# Patient Record
Sex: FEMALE | Race: WHITE | NOT HISPANIC OR LATINO | Employment: FULL TIME | URBAN - METROPOLITAN AREA
[De-identification: names, ages, dates, MRNs, and addresses within clinical notes are randomized per-mention and may not be internally consistent; named-entity substitution may affect disease eponyms.]

---

## 2017-06-16 ENCOUNTER — APPOINTMENT (EMERGENCY)
Dept: CT IMAGING | Facility: HOSPITAL | Age: 37
End: 2017-06-16
Payer: COMMERCIAL

## 2017-06-16 ENCOUNTER — HOSPITAL ENCOUNTER (EMERGENCY)
Facility: HOSPITAL | Age: 37
Discharge: HOME | End: 2017-06-16
Attending: EMERGENCY MEDICINE | Admitting: EMERGENCY MEDICINE
Payer: COMMERCIAL

## 2017-06-16 VITALS
HEIGHT: 65 IN | DIASTOLIC BLOOD PRESSURE: 58 MMHG | TEMPERATURE: 97.6 F | HEART RATE: 68 BPM | WEIGHT: 176 LBS | RESPIRATION RATE: 16 BRPM | BODY MASS INDEX: 29.32 KG/M2 | OXYGEN SATURATION: 97 % | SYSTOLIC BLOOD PRESSURE: 114 MMHG

## 2017-06-16 DIAGNOSIS — R42 VERTIGO: Primary | ICD-10-CM

## 2017-06-16 LAB
ANION GAP SERPL CALCULATED.3IONS-SCNC: 9 MMOL/L (ref 4–13)
BASOPHILS # BLD AUTO: 0.05 THOUSANDS/ΜL (ref 0–0.1)
BASOPHILS NFR BLD AUTO: 1 % (ref 0–1)
BUN SERPL-MCNC: 7 MG/DL (ref 5–25)
CALCIUM SERPL-MCNC: 9.3 MG/DL (ref 8.3–10.1)
CHLORIDE SERPL-SCNC: 104 MMOL/L (ref 100–108)
CLARITY, POC: CLEAR
CO2 SERPL-SCNC: 26 MMOL/L (ref 21–32)
COLOR, POC: NORMAL
CREAT SERPL-MCNC: 0.64 MG/DL (ref 0.6–1.3)
EOSINOPHIL # BLD AUTO: 0.04 THOUSAND/ΜL (ref 0–0.61)
EOSINOPHIL NFR BLD AUTO: 0 % (ref 0–6)
ERYTHROCYTE [DISTWIDTH] IN BLOOD BY AUTOMATED COUNT: 13.1 % (ref 11.6–15.1)
EXT BILIRUBIN, UA: NORMAL
EXT BLOOD URINE: NEGATIVE
EXT GLUCOSE, UA: NEGATIVE
EXT KETONES: 80
EXT NITRITE, UA: NEGATIVE
EXT PH, UA: 6.5
EXT PROTEIN, UA: NORMAL
EXT SPECIFIC GRAVITY, UA: 1.02
EXT UROBILINOGEN: 0.2
GFR SERPL CREATININE-BSD FRML MDRD: >60 ML/MIN/1.73SQ M
GLUCOSE SERPL-MCNC: 88 MG/DL (ref 65–140)
HCG UR QL: NEGATIVE
HCT VFR BLD AUTO: 37.3 % (ref 34.8–46.1)
HGB BLD-MCNC: 12.5 G/DL (ref 11.5–15.4)
LYMPHOCYTES # BLD AUTO: 2.71 THOUSANDS/ΜL (ref 0.6–4.47)
LYMPHOCYTES NFR BLD AUTO: 27 % (ref 14–44)
MCH RBC QN AUTO: 31 PG (ref 26.8–34.3)
MCHC RBC AUTO-ENTMCNC: 33.5 G/DL (ref 31.4–37.4)
MCV RBC AUTO: 93 FL (ref 82–98)
MONOCYTES # BLD AUTO: 0.79 THOUSAND/ΜL (ref 0.17–1.22)
MONOCYTES NFR BLD AUTO: 8 % (ref 4–12)
NEUTROPHILS # BLD AUTO: 6.36 THOUSANDS/ΜL (ref 1.85–7.62)
NEUTS SEG NFR BLD AUTO: 64 % (ref 43–75)
NRBC BLD AUTO-RTO: 0 /100 WBCS
PLATELET # BLD AUTO: 333 THOUSANDS/UL (ref 149–390)
PMV BLD AUTO: 9.4 FL (ref 8.9–12.7)
POTASSIUM SERPL-SCNC: 3.8 MMOL/L (ref 3.5–5.3)
RBC # BLD AUTO: 4.03 MILLION/UL (ref 3.81–5.12)
SODIUM SERPL-SCNC: 139 MMOL/L (ref 136–145)
WBC # BLD AUTO: 9.97 THOUSAND/UL (ref 4.31–10.16)
WBC # BLD EST: NEGATIVE 10*3/UL

## 2017-06-16 PROCEDURE — 70450 CT HEAD/BRAIN W/O DYE: CPT

## 2017-06-16 PROCEDURE — 85025 COMPLETE CBC W/AUTO DIFF WBC: CPT | Performed by: EMERGENCY MEDICINE

## 2017-06-16 PROCEDURE — 80048 BASIC METABOLIC PNL TOTAL CA: CPT | Performed by: EMERGENCY MEDICINE

## 2017-06-16 PROCEDURE — 93005 ELECTROCARDIOGRAM TRACING: CPT | Performed by: EMERGENCY MEDICINE

## 2017-06-16 PROCEDURE — 81025 URINE PREGNANCY TEST: CPT | Performed by: EMERGENCY MEDICINE

## 2017-06-16 PROCEDURE — 36415 COLL VENOUS BLD VENIPUNCTURE: CPT | Performed by: EMERGENCY MEDICINE

## 2017-06-16 PROCEDURE — 96361 HYDRATE IV INFUSION ADD-ON: CPT

## 2017-06-16 PROCEDURE — 96374 THER/PROPH/DIAG INJ IV PUSH: CPT

## 2017-06-16 PROCEDURE — 81002 URINALYSIS NONAUTO W/O SCOPE: CPT | Performed by: EMERGENCY MEDICINE

## 2017-06-16 PROCEDURE — 99284 EMERGENCY DEPT VISIT MOD MDM: CPT

## 2017-06-16 RX ORDER — ALPRAZOLAM 0.5 MG/1
0.5 TABLET ORAL
COMMUNITY
End: 2019-04-30

## 2017-06-16 RX ORDER — ONDANSETRON 2 MG/ML
4 INJECTION INTRAMUSCULAR; INTRAVENOUS ONCE
Status: COMPLETED | OUTPATIENT
Start: 2017-06-16 | End: 2017-06-16

## 2017-06-16 RX ORDER — MECLIZINE HYDROCHLORIDE 25 MG/1
50 TABLET ORAL ONCE
Status: COMPLETED | OUTPATIENT
Start: 2017-06-16 | End: 2017-06-16

## 2017-06-16 RX ORDER — ONDANSETRON 4 MG/1
4 TABLET, ORALLY DISINTEGRATING ORAL EVERY 8 HOURS PRN
Qty: 12 TABLET | Refills: 0 | Status: SHIPPED | OUTPATIENT
Start: 2017-06-16 | End: 2018-04-19 | Stop reason: ALTCHOICE

## 2017-06-16 RX ADMIN — ONDANSETRON 4 MG: 2 INJECTION INTRAMUSCULAR; INTRAVENOUS at 15:56

## 2017-06-16 RX ADMIN — MECLIZINE HYDROCHLORIDE 50 MG: 25 TABLET ORAL at 17:09

## 2017-06-16 RX ADMIN — SODIUM CHLORIDE 1000 ML: 0.9 INJECTION, SOLUTION INTRAVENOUS at 15:55

## 2017-06-19 LAB
ATRIAL RATE: 62 BPM
P AXIS: 70 DEGREES
PR INTERVAL: 152 MS
QRS AXIS: 78 DEGREES
QRSD INTERVAL: 82 MS
QT INTERVAL: 434 MS
QTC INTERVAL: 440 MS
T WAVE AXIS: 62 DEGREES
VENTRICULAR RATE: 62 BPM

## 2018-01-09 NOTE — RESULT NOTES
Message   Please call  Her genital culture came back + for E Coli, a type of bacteria  I am going to start her on 1 week of antibiotics   will fax     Verified Results  (1) GENITAL COMPREHENSIVE CULTURE [id number] 12:00AM Virginia Zambrano     Test Name Result Flag Reference   CULTURE, GENITAL      CULTURE, GENITAL         MICRO NUMBER:      [id number]    TEST STATUS:       FINAL    SPECIMEN SOURCE:   CERVIX    SPECIMEN QUALITY:  ADEQUATE    RESULT:            Heavy growth of Escherichia coli                            E coli                            ----------------                            INT   FALGUNI     AMOX/CLAVULANATE       S     <=2 0     AMPICILLIN             S     <=2 0     AMP/SULBACTAM          S     <=2 0     CEFAZOLIN              NR    <=4 0     CEFEPIME               S     <=1 0     CEFTRIAXONE            S     <=1 0     CIPROFLOXACIN          S     <=0 25     GENTAMICIN             S     <=1 0     IMIPENEM               S     <=0 25     LEVOFLOXACIN           S     <=0 12     PIP/TAZOBACTAM         S     <=4 0     TOBRAMYCIN             S     <=1 0     TRIMETHOPRIM/SULFA     S     <=20 0  S=Susceptible  I=Intermediate  R=Resistant  * = Not Tested  NR = Not Reported  **NN = See Therapy Comments

## 2018-01-10 NOTE — MISCELLANEOUS
Message   Recorded as Task   Date: 11/03/2016 11:54 AM, Created By: Adrianna Howe   Task Name: Medical Complaint Callback   Assigned To: Umair Garcia   Regarding Patient: Jarrett Acosta, Status: In Progress   Comment:    Adrianna Shyam - 03 Nov 2016 11:54 AM     TASK CREATED  Caller: Self; Medical Complaint; [phone] (Home)  pt called - thinks her BV infection is coming back - had it in 11/2015 - itching, burning, discomfort - please advise  [id number] - 08 Nov 2016 12:35 PM     TASK IN PROGRESS   Lesli Nelson - 86 Nov 2016 12:43 PM     TASK EDITED  Not sure if pt has bv or yeast  ov given        Active Problems    1  Anemia (285 9) (D64 9)   2  Anxiety (300 00) (F41 9)   3  Anxiety and depression (300 00,311) (F41 9,F32 9)   4  Bacterial vaginosis (616 10,041 9) (N76 0,B96 89)   5  Current tobacco use (305 1) (Z72 0)   6  Distorted taste (781 1) (R43 2)   7  Dry eye, right (375 15) (H04 121)   8  Dry mouth (527 7) (R68 2)   9  Dysmenorrhea (625 3) (N94 6)   10  Encounter for gynecological examination without abnormal finding (V72 31) (Z01 419)   11  Fatigue (780 79) (R53 83)   12  Menorrhagia (626 2) (N92 0)   13  Metrorrhagia (626 6) (N92 1)   14  Middle ear effusion, bilateral (381 4) (H65 93)   15  MRSA exposure (V01 89) (Z20 818)   16  Need for pneumococcal vaccination (V03 82) (Z23)   17  Need for Tdap vaccination (V06 1) (Z23)   18  Papanicolaou smear for cervical cancer screening (V76 2) (Z12 4)   19  Persistent insomnia (307 42) (G47 00)   20  Postcoital bleeding (626 7) (N93 0)   21  Screening examination for infectious disease (V75 9) (Z11 9)   22  Screening for HPV (human papillomavirus) (V73 81) (Z11 51)   23  Vaginal discharge (623 5) (N89 8)   24  Vaginal discomfort (625 9) (N94 9)   25  Vaginal yeast infection (112 1) (B37 3)   26  Vulvovaginitis (616 10) (N76 0)    Current Meds   1   ALPRAZolam 0 5 MG Oral Tablet; TAKE 1/2 TO 1 TABLET TWICE DAILY AS NEEDED; Therapy: 98QHM9377 to (Evaluate:16Fta2822); Last Rx:17Oct2016 Ordered   2  Clobetasol Propionate 0 05 % External Cream; apply Bid x2 weeks; Therapy: 27Apr2016 to (Last Rx:27Apr2016)  Requested for: 27Apr2016 Ordered   3  Escitalopram Oxalate 10 MG Oral Tablet (Lexapro); TAKE 2 TABLETS DAILY; Therapy: 17Pqc3657 to (Evaluate:51Iog2499)  Requested for: 51Gsh5738; Last   Rx:57Ilr5190 Ordered   4  Ferrous Sulfate 325 (65 Fe) MG Oral Tablet; TAKE 1 TABLET DAILY AS DIRECTED; Therapy: 78NVD0682 to (Evaluate:94Gyp5160); Last Rx:11Jan2016 Ordered   5  Fluocinonide 0 1 % External Cream; APPLY A THIN LAYER TO AFFECTED AREA(S)   TWICE DAILY; Therapy: 21XOG2930 to (Evaluate:92Yya5962)  Requested for: 32BAQ4733; Last   Rx:46Ket0509 Ordered   6  Fluticasone Propionate 50 MCG/ACT Nasal Suspension; USE 2 SPRAYS IN EACH   NOSTRIL ONCE DAILY; Therapy: 98Etk3611 to (Evaluate:28Gul2673)  Requested for: 99Meq6697; Last   Rx:12Zpd4202 Ordered   7  Lo Loestrin Fe 1 MG-10 MCG / 10 MCG Oral Tablet; One a day; Therapy: 34Kfi5477 to (Yomi Kwon)  Requested for: 66Jzg0502; Last   Rx:15Eyd7126 Ordered   8  Lo Loestrin Fe 1 MG-10 MCG / 10 MCG Oral Tablet; TAKE 1 TABLET DAILY; Therapy: 27Apr2016 to (Evaluate:39Kdn6545)  Requested for: 41Nps8815; Last   Rx:76Xam2962 Ordered   9  Sudafed 12 Hour 120 MG Oral Tablet Extended Release 12 Hour; TAKE 1 TABLET   EVERY 12 HOURS AS NEEDED; Therapy: 90Yjn2924 to (Evaluate:38Gji4893)  Requested for: 59Qfq8145; Last   Rx:09Enn4851 Ordered   10  TraZODone HCl - 150 MG Oral Tablet; Take 1 tablet by mouth at bedtime; Therapy: 98XXL0604 to (Evaluate:57Crq9643)  Requested for: 35FNN0603; Last    Rx:17Oct2016 Ordered    Allergies    1  Lidocaine HCl SOLN   2  Penicillins    3  Bee sting    Signatures   Electronically signed by :  Pam Purcell, ; Nov  3 2016 12:43PM EST                       (Author)

## 2018-01-11 NOTE — MISCELLANEOUS
Message   Recorded as Task   Date: 03/23/2016 12:46 PM, Created By: Tegan Cates   Task Name: Call Back   Assigned To: Isaura Blanchard   Regarding Patient: Katie Jacobsen, Status: In Progress   Josey Mcallister - 23 Mar 2016 12:46 PM     TASK CREATED  Caller: Self; [phone] (Home)  pt called - she thinks she still has BV, she still has burning and itching  please advise [phone]   Yessenia Kilpatrick - 23 Mar 2016 1:22 PM     TASK IN PROGRESS   Yessenia Kilpatrick - 23 Mar 2016 1:30 PM     TASK EDITED  pt was treated for yeast and BV by Surya Luna in December - (given metrogel)  She then went to her PCP in January - had cultures done because symptoms were persisting - and culture came back positive E-Coli  She was treated with an antibiotic , seemed to get better, and is now having symptoms again  Burning, irritation, bloody discharge after having intercourse    What would u like to do?  OV?    [phone]   Amita Banuelos - 25 Mar 2016 8:20 AM     TASK EDITED  patient called back wants to know status of care please advise   Izabella Wing - 25 Mar 2016 8:35 AM     TASK EDITED  apt sched with jh at 3pm today        Active Problems    1  Anemia (285 9) (D64 9)   2  Anxiety (300 00) (F41 9)   3  Anxiety and depression (300 00,311) (F41 9,F32 9)   4  Bacterial vaginosis (616 10,041 9) (N76 0,B96 89)   5  Current tobacco use (305 1) (Z72 0)   6  Distorted taste (781 1) (R43 2)   7  Dry eye, right (375 15) (H04 121)   8  Dry mouth (527 7) (R68 2)   9  Fatigue (780 79) (R53 83)   10  MRSA exposure (V01 89) (Z20 818)   11  Need for pneumococcal vaccination (V03 82) (Z23)   12  Need for Tdap vaccination (V06 1) (Z23)   13  Papanicolaou smear for cervical cancer screening (V76 2) (Z12 4)   14  Persistent insomnia (307 42) (G47 00)   15  Screening examination for infectious disease (V75 9) (Z11 9)   16  Vaginal discharge (623 5) (N89 8)   17  Vaginal discomfort (625 9) (N94 9)   18   Vaginal yeast infection (112 1) (B37 3)   19  Vulvovaginitis (616 10) (N76 0)    Current Meds   1  ALPRAZolam 0 5 MG Oral Tablet; TAKE 1/2 TO 1 TABLET TWICE DAILY AS NEEDED; Therapy: 89RZM7345 to (Evaluate:56Wzm5187); Last BB:62KSI2018 Ordered   2  BuPROPion HCl ER (Smoking Det) 150 MG Oral Tablet Extended Release 12 Hour;   TAKE 1 TABLET ONCE A DAY FOR 2 DAYS THEN TAKE 1 TABLET TWICE A   DAY THEREAFTER; Therapy: 07QLH7685 to (Evaluate:63Rfr6401)  Requested for: 16GKJ1926; Last   Rx:18Feb2016 Ordered   3  Ferrous Sulfate 325 (65 Fe) MG Oral Tablet; TAKE 1 TABLET DAILY AS DIRECTED; Therapy: 73OSK1375 to (Evaluate:63Shj9527); Last Rx:11Jan2016 Ordered   4  TraZODone HCl - 150 MG Oral Tablet; TAKE ONE TABLET BY MOUTH AT BEDTIME; Therapy: 82TKT4754 to (Evaluate:11Mar2016)  Requested for: 02FZH0227; Last   Rx:11Jan2016 Ordered    Allergies    1  Lidocaine HCl SOLN   2  Penicillins    3   Bee sting    Signatures   Electronically signed by : Tuyet Feng, ; Mar 25 2016  8:36AM EST                       (Author)

## 2018-01-11 NOTE — RESULT NOTES
Message   Recorded as Task   Date: 11/07/2016 12:19 PM, Created By: Lambert Sevilla   Task Name: Verify Patient Results   Assigned To: Manda Marks   Regarding Patient: Kei Lopez, Status: Active   CommentEvorn Bills - 07 Nov 2016 12:19 PM        Manda Marks - 08 Nov 2016 10:49 AM     TASK EDITED  Card sent that cultures are neagtive  Signatures   Electronically signed by : Buzz Barrera CNM;  Nov 8 2016 10:49AM EST                       (Author)

## 2018-01-12 NOTE — MISCELLANEOUS
Message   Recorded as Task   Date: 08/02/2016 03:03 PM, Created By: [facility]   Task Name: Follow Up   Assigned To: Sis Mccray   Regarding Patient: Keeley Ramos, Status: Active   Comment:    Izabella Wing - 02 Aug 2016 3:03 PM     TASK CREATED  this pt is req refil for lo loestrin    she was only given 1 pack with  0 refils   Manda Marks - 02 Aug 2016 3:41 PM     TASK REPLIED TO: Previously Assigned To [facility],Team  that's fine   Izabella Wing - 02 Aug 2016 4:12 PM     TASK EDITED   rx to ehr until yrly        Active Problems    1  Anemia (285 9) (D64 9)   2  Anxiety (300 00) (F41 9)   3  Anxiety and depression (300 00,311) (F41 9,F32 9)   4  Bacterial vaginosis (616 10,041 9) (N76 0,B96 89)   5  Current tobacco use (305 1) (Z72 0)   6  Distorted taste (781 1) (R43 2)   7  Dry eye, right (375 15) (H04 121)   8  Dry mouth (527 7) (R68 2)   9  Dysmenorrhea (625 3) (N94 6)   10  Encounter for gynecological examination without abnormal finding (V72 31) (Z01 419)   11  Fatigue (780 79) (R53 83)   12  Menorrhagia (626 2) (N92 0)   13  Metrorrhagia (626 6) (N92 1)   14  MRSA exposure (V01 89) (Z20 818)   15  Need for pneumococcal vaccination (V03 82) (Z23)   16  Need for Tdap vaccination (V06 1) (Z23)   17  Papanicolaou smear for cervical cancer screening (V76 2) (Z12 4)   18  Persistent insomnia (307 42) (G47 00)   19  Postcoital bleeding (626 7) (N93 0)   20  Screening examination for infectious disease (V75 9) (Z11 9)   21  Screening for HPV (human papillomavirus) (V73 81) (Z11 51)   22  Vaginal discharge (623 5) (N89 8)   23  Vaginal discomfort (625 9) (N94 9)   24  Vaginal yeast infection (112 1) (B37 3)   25  Vulvovaginitis (616 10) (N76 0)    Current Meds   1  ALPRAZolam 0 5 MG Oral Tablet; TAKE 1/2 TO 1 TABLET TWICE DAILY AS NEEDED; Therapy: 97WOE8604 to (Evaluate:28Apr2016); Last Rx:29Mar2016 Ordered   2   BuPROPion HCl ER (Smoking Det) 150 MG Oral Tablet Extended Release 12 Hour;   TAKE 1 TABLET ONCE A DAY FOR 2 DAYS THEN TAKE 1 TABLET TWICE A   DAY THEREAFTER; Therapy: 08ZXK9438 to (Evaluate:35Klx9334)  Requested for: 54XEN4266; Last   Rx:95Whd5636 Ordered   3  Clobetasol Propionate 0 05 % External Cream; apply Bid x2 weeks; Therapy: 27Apr2016 to (Last Rx:27Apr2016)  Requested for: 27Apr2016 Ordered   4  Ferrous Sulfate 325 (65 Fe) MG Oral Tablet; TAKE 1 TABLET DAILY AS DIRECTED; Therapy: 67XFJ8070 to (Evaluate:11Jdx6853); Last Rx:11Jan2016 Ordered   5  Fluocinonide 0 1 % External Cream; APPLY A THIN LAYER TO AFFECTED AREA(S)   TWICE DAILY; Therapy: 97QRN2008 to (Evaluate:24Apr2016)  Requested for: 95ZKK1419; Last   Rx:25Mar2016 Ordered   6  Lo Loestrin Fe 1 MG-10 MCG / 10 MCG Oral Tablet; One a day; Therapy: 35Yyx5224 to (Tacos Rodrigez)  Requested for: 95Ecd5343; Last   Rx:81Ctg5890 Ordered   7  Lo Loestrin Fe 1 MG-10 MCG / 10 MCG Oral Tablet; TAKE 1 TABLET DAILY; Therapy: 27Apr2016 to (Evaluate:25May2016) Recorded   8  TraZODone HCl - 150 MG Oral Tablet; TAKE ONE TABLET BY MOUTH AT BEDTIME; Therapy: 89UBB5063 to (Evaluate:11Mar2016)  Requested for: 99XRN1939; Last   Rx:11Jan2016 Ordered    Allergies    1  Lidocaine HCl SOLN   2  Penicillins    3   Bee sting    Plan  Metrorrhagia    · Lo Loestrin Fe 1 MG-10 MCG / 10 MCG Oral Tablet; TAKE 1 TABLET DAILY    Signatures   Electronically signed by : Ruth Heart, ; Aug  2 2016  4:13PM EST                       (Author)

## 2018-01-13 NOTE — MISCELLANEOUS
Discussion/Summary  Discussion Summary:   PATIENT HAS BEEN GOING THROUGH PAD EVERY 2-3 HRS FOR 2 WEEKS  DENIES SYNCOPE  FEELS TIRED  RECOMMENDED #1 TAPERING DOSE OCP 3X3, 2X2, 1 THEREAFTER  DRINK 1 GAL WATER PER DAY  DOUBLE UP ON IRON  OFF WORK FOR 3 DAYS AND OFF FEET ( WE WILL GIVE HER A NOTE)  ORDER CBC AND SALINESONOHYSTEROGRAM  OFFICE VISIT WITH KAMRNY WHEN SHE RETURNS        Signatures   Electronically signed by : RHONDA Jenkins ; May 20 2016 11:56AM EST                       (Author)

## 2018-01-13 NOTE — RESULT NOTES
Message   Recorded as Task   Date: 05/02/2016 12:45 PM, Created By: Sadi Cheng   Task Name: Verify Patient Results   Assigned To:  Manda Marks   Regarding Patient: Katie Jacobsen, Status: Active   CommentCharles Mick - 02 May 2016 12:45 PM        Manda Marks - 06 May 2016 4:13 PM     TASK EDITED  Cards sent that Pap was normal        Signatures   Electronically signed by : Domitila Jules CNM; May  6 2016  4:13PM EST                       (Author)

## 2018-01-14 NOTE — MISCELLANEOUS
Signatures   Electronically signed by : Marquis Salvador MD; Nov  3 2016  4:43PM EST                       (Author)

## 2018-01-14 NOTE — MISCELLANEOUS
Message   Recorded as Task   Date: 04/01/2016 09:30 AM, Created By: Gigi Jones   Task Name: Follow Up   Assigned To: Gigi Lorenzo   Regarding Patient: Shon Aguilera, Status: Active   Gabriella Rojas - 01 Apr 2016 9:30 AM     TASK CREATED  Caller: Self; [phone] (Home)  PT LOOKING FOR CULTURE AND PAP RESULTS [phone]   Izabella Wing - 01 Apr 2016 9:40 AM     TASK EDITED  inf pt pap and cultures still pending    she wcb 04/04        Active Problems    1  Anemia (285 9) (D64 9)   2  Anxiety (300 00) (F41 9)   3  Anxiety and depression (300 00,311) (F41 9,F32 9)   4  Bacterial vaginosis (616 10,041 9) (N76 0,B96 89)   5  Current tobacco use (305 1) (Z72 0)   6  Distorted taste (781 1) (R43 2)   7  Dry eye, right (375 15) (H04 121)   8  Dry mouth (527 7) (R68 2)   9  Encounter for gynecological examination without abnormal finding (V72 31) (Z01 419)   10  Fatigue (780 79) (R53 83)   11  MRSA exposure (V01 89) (Z20 818)   12  Need for pneumococcal vaccination (V03 82) (Z23)   13  Need for Tdap vaccination (V06 1) (Z23)   14  Papanicolaou smear for cervical cancer screening (V76 2) (Z12 4)   15  Persistent insomnia (307 42) (G47 00)   16  Postcoital bleeding (626 7) (N93 0)   17  Screening examination for infectious disease (V75 9) (Z11 9)   18  Screening for HPV (human papillomavirus) (V73 81) (Z11 51)   19  Vaginal discharge (623 5) (N89 8)   20  Vaginal discomfort (625 9) (N94 9)   21  Vaginal yeast infection (112 1) (B37 3)   22  Vulvovaginitis (616 10) (N76 0)    Current Meds   1  ALPRAZolam 0 5 MG Oral Tablet; TAKE 1/2 TO 1 TABLET TWICE DAILY AS NEEDED; Therapy: 46GYM8399 to (Evaluate:28Apr2016); Last Rx:29Mar2016 Ordered   2  BuPROPion HCl ER (Smoking Det) 150 MG Oral Tablet Extended Release 12 Hour;   TAKE 1 TABLET ONCE A DAY FOR 2 DAYS THEN TAKE 1 TABLET TWICE A   DAY THEREAFTER; Therapy: 23UVM1281 to (Evaluate:52Jnx9268)  Requested for: 74RSX8641; Last   Rx:18Feb2016 Ordered   3  Ferrous Sulfate 325 (65 Fe) MG Oral Tablet; TAKE 1 TABLET DAILY AS DIRECTED; Therapy: 96MPZ9770 to (Evaluate:39Nti8919); Last Rx:11Jan2016 Ordered   4  Fluocinonide 0 1 % External Cream; APPLY A THIN LAYER TO AFFECTED AREA(S)   TWICE DAILY; Therapy: 30OXM1705 to (Evaluate:24Apr2016)  Requested for: 45DPO9774; Last   Rx:25Mar2016 Ordered   5  TraZODone HCl - 150 MG Oral Tablet; TAKE ONE TABLET BY MOUTH AT BEDTIME; Therapy: 50AOF5369 to (Evaluate:11Mar2016)  Requested for: 46DAL5317; Last   Rx:11Jan2016 Ordered    Allergies    1  Lidocaine HCl SOLN   2  Penicillins    3   Bee sting    Signatures   Electronically signed by : Tina Castellanos, ; Apr 1 2016  9:40AM EST                       (Author)

## 2018-01-15 NOTE — MISCELLANEOUS
Message   Recorded as Task   Date: 05/20/2016 11:51 AM, Created By: Renetta Banda   Task Name: Follow Up   Assigned To: Haile Gay   Regarding Patient: Jaylin Mark, Status: In Progress   Comment:    Rajendra Toth - 20 May 2016 11:51 AM     TASK CREATED  ORDER HER SALINESONOHYSTEROGRAM, CBC, AND GET HER F/U APPT WITH KAMRYN WHEN SHE COMES BACK  SEE NOTE FOR CARE PLAN   Izabella Wing - 20 May 2016 11:59 AM     TASK REASSIGNED: Previously Assigned To Amy Recinos    please send this back to me after you sched the salinesonohyst and I will take care of ordering the cbc   MeccaIzabella - 20 May 2016 12:00 PM     TASK IN PROGRESS   Izabella Wing - 20 May 2016 12:02 PM     TASK EDITED  order placed in allscripts for cbc/diff    after procedure sched, I will call pt  Addie Allen - 20 May 2016 12:02 PM     TASK REASSIGNED: Previously Assigned To Sherry Medrano - 20 May 2016 1:19 PM     TASK REPLIED TO: Previously Assigned To Sherry Lewis  I do not schedule these only HSG  the pt's call radiology dept and schedule them thereselves  thanks   MeccaIzabella - 20 May 2016 1:34 PM     TASK EDITED  lm for pt tcb     needs tc central sched for salinesonohysterogram  cbc/diff  has been placed in allscripts   MeccaIzabella - 20 May 2016 2:10 PM     TASK EDITED  spoke with pt    order placed in [facility] for cbc/diff    gave pt instructions to sched salinesonohysterogram        Active Problems    1  Anemia (285 9) (D64 9)   2  Anxiety (300 00) (F41 9)   3  Anxiety and depression (300 00,311) (F41 9,F32 9)   4  Bacterial vaginosis (616 10,041 9) (N76 0,B96 89)   5  Current tobacco use (305 1) (Z72 0)   6  Distorted taste (781 1) (R43 2)   7  Dry eye, right (375 15) (H04 121)   8  Dry mouth (527 7) (R68 2)   9  Dysmenorrhea (625 3) (N94 6)   10  Encounter for gynecological examination without abnormal finding (V72 31) (Z01 419)   11  Fatigue (780 79) (R53 83)   12   Metrorrhagia (626 6) (N92 1)   13  MRSA exposure (V01 89) (Z20 818)   14  Need for pneumococcal vaccination (V03 82) (Z23)   15  Need for Tdap vaccination (V06 1) (Z23)   16  Papanicolaou smear for cervical cancer screening (V76 2) (Z12 4)   17  Persistent insomnia (307 42) (G47 00)   18  Postcoital bleeding (626 7) (N93 0)   19  Screening examination for infectious disease (V75 9) (Z11 9)   20  Screening for HPV (human papillomavirus) (V73 81) (Z11 51)   21  Vaginal discharge (623 5) (N89 8)   22  Vaginal discomfort (625 9) (N94 9)   23  Vaginal yeast infection (112 1) (B37 3)   24  Vulvovaginitis (616 10) (N76 0)    Current Meds   1  ALPRAZolam 0 5 MG Oral Tablet; TAKE 1/2 TO 1 TABLET TWICE DAILY AS NEEDED; Therapy: 34CWH4476 to (Evaluate:43Ktr7954); Last Rx:29Mar2016 Ordered   2  BuPROPion HCl ER (Smoking Det) 150 MG Oral Tablet Extended Release 12 Hour;   TAKE 1 TABLET ONCE A DAY FOR 2 DAYS THEN TAKE 1 TABLET TWICE A   DAY THEREAFTER; Therapy: 41RZJ9986 to (Evaluate:11Dbs2954)  Requested for: 26MBJ3396; Last   Rx:08Tti4079 Ordered   3  Clobetasol Propionate 0 05 % External Cream; apply Bid x2 weeks; Therapy: 27Apr2016 to (Last Rx:27Apr2016)  Requested for: 27Apr2016 Ordered   4  Ferrous Sulfate 325 (65 Fe) MG Oral Tablet; TAKE 1 TABLET DAILY AS DIRECTED; Therapy: 05JLY3298 to (Evaluate:08Sse7425); Last Rx:11Jan2016 Ordered   5  Fluocinonide 0 1 % External Cream; APPLY A THIN LAYER TO AFFECTED AREA(S)   TWICE DAILY; Therapy: 80YNC4522 to (Evaluate:97Pds7418)  Requested for: 65CBX2488; Last   Rx:25Mar2016 Ordered   6  Lo Loestrin Fe 1 MG-10 MCG / 10 MCG Oral Tablet; TAKE 1 TABLET DAILY; Therapy: 27Apr2016 to (Evaluate:37Upx1877) Recorded   7  TraZODone HCl - 150 MG Oral Tablet; TAKE ONE TABLET BY MOUTH AT BEDTIME; Therapy: 99JUZ7319 to (Evaluate:11Mar2016)  Requested for: 32ZSR6474; Last   Rx:11Jan2016 Ordered    Allergies    1  Lidocaine HCl SOLN   2  Penicillins    3   Bee sting    Signatures   Electronically signed by Ethel Fang, ; May 20 2016  2:11PM EST                       (Author)

## 2018-01-17 NOTE — MISCELLANEOUS
Message   Recorded as Task   Date: 05/24/2016 03:01 PM, Created By: Enedelia Corbin   Task Name: Call Back   Assigned To: [place] Shanika   Regarding Patient: Kei Lopez, Status: Active   Comment:    Izaeblla Wing - 24 May 2016 3:01 PM     TASK CREATED  spoke with cg re pts sis    states she needs pelvic u/s prior    pt informed and given instructions for sched    order placed in allscripts for sis and pelvic u/s        Active Problems    1  Anemia (285 9) (D64 9)   2  Anxiety (300 00) (F41 9)   3  Anxiety and depression (300 00,311) (F41 9,F32 9)   4  Bacterial vaginosis (616 10,041 9) (N76 0,B96 89)   5  Current tobacco use (305 1) (Z72 0)   6  Distorted taste (781 1) (R43 2)   7  Dry eye, right (375 15) (H04 121)   8  Dry mouth (527 7) (R68 2)   9  Dysmenorrhea (625 3) (N94 6)   10  Encounter for gynecological examination without abnormal finding (V72 31) (Z01 419)   11  Fatigue (780 79) (R53 83)   12  Menorrhagia (626 2) (N92 0)   13  Metrorrhagia (626 6) (N92 1)   14  MRSA exposure (V01 89) (Z20 818)   15  Need for pneumococcal vaccination (V03 82) (Z23)   16  Need for Tdap vaccination (V06 1) (Z23)   17  Papanicolaou smear for cervical cancer screening (V76 2) (Z12 4)   18  Persistent insomnia (307 42) (G47 00)   19  Postcoital bleeding (626 7) (N93 0)   20  Screening examination for infectious disease (V75 9) (Z11 9)   21  Screening for HPV (human papillomavirus) (V73 81) (Z11 51)   22  Vaginal discharge (623 5) (N89 8)   23  Vaginal discomfort (625 9) (N94 9)   24  Vaginal yeast infection (112 1) (B37 3)   25  Vulvovaginitis (616 10) (N76 0)    Current Meds   1  ALPRAZolam 0 5 MG Oral Tablet; TAKE 1/2 TO 1 TABLET TWICE DAILY AS NEEDED; Therapy: 42YKV0494 to (Evaluate:28Apr2016); Last Rx:29Mar2016 Ordered   2  BuPROPion HCl ER (Smoking Det) 150 MG Oral Tablet Extended Release 12 Hour;   TAKE 1 TABLET ONCE A DAY FOR 2 DAYS THEN TAKE 1 TABLET TWICE A   DAY THEREAFTER;    Therapy: 66YOU4492 to (Evaluate:89Sby8415)  Requested for: 75QPO0198; Last   Rx:47Xuw6915 Ordered   3  Clobetasol Propionate 0 05 % External Cream; apply Bid x2 weeks; Therapy: 27Apr2016 to (Last Rx:27Apr2016)  Requested for: 27Apr2016 Ordered   4  Ferrous Sulfate 325 (65 Fe) MG Oral Tablet; TAKE 1 TABLET DAILY AS DIRECTED; Therapy: 93HQK7694 to (Evaluate:55Fwa2738); Last Rx:11Jan2016 Ordered   5  Fluocinonide 0 1 % External Cream; APPLY A THIN LAYER TO AFFECTED AREA(S)   TWICE DAILY; Therapy: 84WDW2857 to (Evaluate:24Apr2016)  Requested for: 53HHM8057; Last   Rx:25Mar2016 Ordered   6  Lo Loestrin Fe 1 MG-10 MCG / 10 MCG Oral Tablet; TAKE 1 TABLET DAILY; Therapy: 27Apr2016 to (Evaluate:22Cft7736) Recorded   7  TraZODone HCl - 150 MG Oral Tablet; TAKE ONE TABLET BY MOUTH AT BEDTIME; Therapy: 38PGT6158 to (Evaluate:11Mar2016)  Requested for: 82XER9694; Last   Rx:11Jan2016 Ordered    Allergies    1  Lidocaine HCl SOLN   2  Penicillins    3  Bee sting    Plan  Menorrhagia    · US PELVIS COMPLETE NON OB; Status:Hold For - Scheduling,Retrospective By  Protocol Authorization; Requested for:24May2016;    · [facility]; Status:Hold For -  Scheduling,Retrospective By Protocol Authorization;  Requested for:24May2016;     Signatures   Electronically signed by : Lucina Patel, ; May 24 2016  3:02PM EST                       (Author)

## 2018-01-17 NOTE — RESULT NOTES
Message   labs are normal     Verified Results  (1) COMPREHENSIVE METABOLIC PANEL [id number] 40:57FK [place] [employer]     Test Name Result Flag Reference   GLUCOSE 82 mg/dL  65-99   Fasting reference interval   UREA NITROGEN (BUN) 14 mg/dL  7-25   CREATININE 0 70 mg/dL  0 50-1 10   eGFR NON-AFR   AMERICAN 111 mL/min/1 73m2  > OR = 60   eGFR AFRICAN AMERICAN 129 mL/min/1 73m2  > OR = 60   BUN/CREATININE RATIO   3-71   NOT APPLICABLE (calc)   SODIUM 139 mmol/L  135-146   POTASSIUM 4 5 mmol/L  3 5-5 3   CHLORIDE 105 mmol/L     CARBON DIOXIDE 26 mmol/L  19-30   CALCIUM 9 7 mg/dL  8 6-10 2   PROTEIN, TOTAL 6 9 g/dL  6 1-8 1   ALBUMIN 4 3 g/dL  3 6-5 1   GLOBULIN 2 6 g/dL (calc)  1 9-3 7   ALBUMIN/GLOBULIN RATIO 1 7 (calc)  1 0-2 5   BILIRUBIN, TOTAL 0 3 mg/dL  0 2-1 2   ALKALINE PHOSPHATASE 60 U/L     AST 14 U/L  10-30   ALT 11 U/L  6-29     (1) CBC/PLT/DIFF [id number] 09:44AM [place]     Test Name Result Flag Reference   WHITE BLOOD CELL COUNT 6 4 Thousand/uL  3 8-10 8   RED BLOOD CELL COUNT 4 22 Million/uL  3 80-5 10   HEMOGLOBIN 12 0 g/dL  11 7-15 5   HEMATOCRIT 37 5 %  35 0-45 0   MCV 88 9 fL  80 0-100 0   MCH 28 4 pg  27 0-33 0   MCHC 31 9 g/dL L 32 0-36 0   RDW 15 8 % H 11 0-15 0   PLATELET COUNT 263 Thousand/uL  140-400   MPV 8 8 fL  7 5-11 5   ABSOLUTE NEUTROPHILS 3776 cells/uL  1910-7392   ABSOLUTE LYMPHOCYTES 2086 cells/uL  850-3900   ABSOLUTE MONOCYTES 422 cells/uL  200-950   ABSOLUTE EOSINOPHILS 70 cells/uL     ABSOLUTE BASOPHILS 45 cells/uL  0-200   NEUTROPHILS 59 0 %     LYMPHOCYTES 32 6 %     MONOCYTES 6 6 %     EOSINOPHILS 1 1 %     BASOPHILS 0 7 %       (Q) LIPID PANEL WITH REFLEX TO DIRECT LDL [id number] 09:44AM [place]     Test Name Result Flag Reference   CHOLESTEROL, TOTAL 199 mg/dL  125-200   HDL CHOLESTEROL 71 mg/dL  > OR = 46   TRIGLICERIDES 55 mg/dL  <394   LDL-CHOLESTEROL 117 mg/dL (calc)  <130   Desirable range <100 mg/dL for patients with CHD or  diabetes and <70 mg/dL for diabetic patients with  known heart disease  CHOL/HDLC RATIO 2 8 (calc)  < OR = 5 0   NON HDL CHOLESTEROL 128 mg/dL (calc)     Target for non-HDL cholesterol is 30 mg/dL higher than   LDL cholesterol target  (Q) HEMOGLOBIN A1c [id number] 09:44AM Katie Burk     Test Name Result Flag Reference   HEMOGLOBIN A1c 5 5 % of total Hgb  <5 7   According to ADA guidelines, hemoglobin A1c <7 0%  represents optimal control in non-pregnant diabetic  patients  Different metrics may apply to specific  patient populations  Standards of Medical Care in    Diabetes Care  2013;36:s11-s66     For the purpose of screening for the presence of  diabetes  <5 7%       Consistent with the absence of diabetes  5 7-6 4%    Consistent with increased risk for diabetes              (prediabetes)  >or=6 5%    Consistent with diabetes     This assay result is consistent with a decreased risk  of diabetes  Currently, no consensus exists for use of hemoglobin  A1c for diagnosis of diabetes for children

## 2018-04-16 RX ORDER — ALPRAZOLAM 0.5 MG/1
0.5 TABLET ORAL
Qty: 30 TABLET | Refills: 0 | OUTPATIENT
Start: 2018-04-16

## 2018-04-19 ENCOUNTER — OFFICE VISIT (OUTPATIENT)
Dept: FAMILY MEDICINE CLINIC | Facility: CLINIC | Age: 38
End: 2018-04-19
Payer: COMMERCIAL

## 2018-04-19 VITALS
TEMPERATURE: 98 F | SYSTOLIC BLOOD PRESSURE: 106 MMHG | RESPIRATION RATE: 16 BRPM | DIASTOLIC BLOOD PRESSURE: 70 MMHG | HEART RATE: 87 BPM | OXYGEN SATURATION: 98 % | HEIGHT: 65 IN | BODY MASS INDEX: 29.56 KG/M2 | WEIGHT: 177.4 LBS

## 2018-04-19 DIAGNOSIS — F17.200 CURRENT EVERY DAY SMOKER: ICD-10-CM

## 2018-04-19 DIAGNOSIS — F41.9 ANXIETY: Primary | ICD-10-CM

## 2018-04-19 DIAGNOSIS — Z13.220 NEED FOR LIPID SCREENING: ICD-10-CM

## 2018-04-19 DIAGNOSIS — F51.01 PRIMARY INSOMNIA: ICD-10-CM

## 2018-04-19 DIAGNOSIS — Z13.1 DIABETES MELLITUS SCREENING: ICD-10-CM

## 2018-04-19 PROCEDURE — 3008F BODY MASS INDEX DOCD: CPT | Performed by: FAMILY MEDICINE

## 2018-04-19 PROCEDURE — 99214 OFFICE O/P EST MOD 30 MIN: CPT | Performed by: FAMILY MEDICINE

## 2018-04-19 RX ORDER — MECLIZINE HYDROCHLORIDE CHEWABLE TABLETS 25 MG/1
1 TABLET, CHEWABLE ORAL 3 TIMES DAILY
COMMUNITY
End: 2019-04-30

## 2018-04-19 RX ORDER — BUPROPION HYDROCHLORIDE 100 MG/1
100 TABLET, EXTENDED RELEASE ORAL 2 TIMES DAILY
Qty: 60 TABLET | Refills: 1 | Status: SHIPPED | OUTPATIENT
Start: 2018-04-19 | End: 2019-04-30

## 2018-04-19 RX ORDER — ALPRAZOLAM 1 MG/1
1 TABLET ORAL 2 TIMES DAILY PRN
Qty: 60 TABLET | Refills: 0 | Status: SHIPPED | OUTPATIENT
Start: 2018-04-19 | End: 2018-06-18 | Stop reason: SDUPTHER

## 2018-04-19 RX ORDER — BENZONATATE 100 MG/1
100 CAPSULE ORAL 3 TIMES DAILY PRN
COMMUNITY
End: 2018-04-19 | Stop reason: ALTCHOICE

## 2018-04-19 RX ORDER — TRAZODONE HYDROCHLORIDE 150 MG/1
150 TABLET ORAL
Qty: 30 TABLET | Refills: 1 | Status: SHIPPED | OUTPATIENT
Start: 2018-04-19 | End: 2019-03-26 | Stop reason: SDUPTHER

## 2018-04-19 NOTE — PROGRESS NOTES
Assessment/Plan:    No problem-specific Assessment & Plan notes found for this encounter  Diagnoses and all orders for this visit:    Anxiety  after discussing risks and benefits of medication along with side effects will increase Xanax at this time to 1 mg twice daily   -     Alprazolam (XANAX) 1 mg tablet; Take 1 tablet (1 mg total) by mouth 2 (two) times a day as needed for anxiety for up to 30 days    Primary insomnia  trazodone refilled, she was advised to take xanax or trazodone for sleep but not both given increased risk of respiratory depression  Preferred trazodone as it is for sleep  She understand this and agrees  -     traZODone (DESYREL) 150 mg tablet; Take 1 tablet (150 mg total) by mouth daily at bedtime for 30 days    Current every day smoker  after explaining risks and benefits along with side effects will start zyban at this time  -     buPROPion (WELLBUTRIN SR) 100 mg 12 hr tablet; Take 1 tablet (100 mg total) by mouth 2 (two) times a day for 30 days    Diabetes mellitus screening  -     Comprehensive metabolic panel; Future    Need for lipid screening  -     Lipid panel; Future    Other orders  -     Meclizine HCl 25 MG CHEW; Chew 1 tablet 3 (three) times a day  -     TRAZODONE HCL PO; Take by mouth  -     Discontinue: benzonatate (TESSALON PERLES) 100 mg capsule; Take 100 mg by mouth 3 (three) times a day as needed for cough      Follow up in 1 month    Subjective:      Patient ID: Kati Matt is a 44 y o  female  Anxiety  Patient is here for evaluation of anxiety  She has the following anxiety symptoms: difficulty concentrating, insomnia, irritable, psychomotor agitation, racing thoughts  Onset of symptoms was approximately several years ago  Symptoms have been gradually worsening since that time  She denies current suicidal and homicidal ideation  Family history significant for no psychiatric illness  Possible organic causes contributing are: none   Risk factors: none Previous treatment includes medication Xanax  She complains of the following medication side effects: none  Smoking Cessation  She presents for discussion regarding smoking cessation  She began smoking many years ago ago  She currently smokes 1 packs per day  She has attempted to quit smoking in the past, most recently unknown years ago  Best success quitting using willpower  Barriers to quitting include spouse/partner smokes  She denies constant cough and cough after eating  Bridgett Ramos is a 44 y o  female who complains of insomnia  Onset was several years ago  Patient describes symptoms as frequent night time awakening and non-restful sleep  Patient has found minimal relief with going to sleep at the same time each night and melatonin use  Associated symptoms include: daytime somnolence and irritability  Patient denies frequent nighttime urination and leg cramps  Symptoms have been well-controlled  The following portions of the patient's history were reviewed and updated as appropriate:   She  has a past medical history of Anxiety; Depression; Endometriosis; History of varicose veins; and Sleep disturbances  She   Patient Active Problem List    Diagnosis Date Noted    Anxiety 04/19/2018    Primary insomnia 04/19/2018    Current every day smoker 04/19/2018    Diabetes mellitus screening 04/19/2018    Need for lipid screening 04/19/2018     She  has a past surgical history that includes LAPAROSCOPY; Tubal ligation; Hernia repair; and Laparoscopic inguinal hernia repair  Her family history includes Arthritis in her family; Hepatitis in her mother; Hyperlipidemia in her father; Lung cancer in her family; Lupus in her mother  She  reports that she has been smoking Cigarettes  She does not have any smokeless tobacco history on file  She reports that she does not drink alcohol or use drugs    Current Outpatient Prescriptions   Medication Sig Dispense Refill    ALPRAZolam (XANAX) 0 5 mg tablet Take 0 5 mg by mouth daily at bedtime as needed for anxiety      Meclizine HCl 25 MG CHEW Chew 1 tablet 3 (three) times a day      TRAZODONE HCL PO Take by mouth      ALPRAZolam (XANAX) 1 mg tablet Take 1 tablet (1 mg total) by mouth 2 (two) times a day as needed for anxiety for up to 30 days 60 tablet 0    buPROPion (WELLBUTRIN SR) 100 mg 12 hr tablet Take 1 tablet (100 mg total) by mouth 2 (two) times a day for 30 days 60 tablet 1    traZODone (DESYREL) 150 mg tablet Take 1 tablet (150 mg total) by mouth daily at bedtime for 30 days 30 tablet 1     No current facility-administered medications for this visit  Current Outpatient Prescriptions on File Prior to Visit   Medication Sig    ALPRAZolam (XANAX) 0 5 mg tablet Take 0 5 mg by mouth daily at bedtime as needed for anxiety    [DISCONTINUED] ondansetron (ZOFRAN-ODT) 4 mg disintegrating tablet Take 1 tablet by mouth every 8 (eight) hours as needed for nausea or vomiting for up to 30 days     No current facility-administered medications on file prior to visit  She is allergic to bee venom; lidocaine; and penicillins       Review of Systems   Constitutional: Negative for activity change, appetite change, fatigue and fever  HENT: Negative for congestion and ear discharge  Respiratory: Negative for cough and shortness of breath  Cardiovascular: Negative for chest pain and palpitations  Gastrointestinal: Negative for diarrhea and nausea  Musculoskeletal: Negative for arthralgias and back pain  Skin: Negative for color change and rash  Neurological: Negative for dizziness and headaches  Psychiatric/Behavioral: Positive for agitation, behavioral problems and sleep disturbance           Objective:      /70 (BP Location: Left arm, Patient Position: Sitting, Cuff Size: Adult)   Pulse 87   Temp 98 °F (36 7 °C) (Tympanic)   Resp 16   Ht 5' 5" (1 651 m)   Wt 80 5 kg (177 lb 6 4 oz)   SpO2 98%   BMI 29 52 kg/m²          Physical Exam Constitutional: She is oriented to person, place, and time  She appears well-developed and well-nourished  No distress  HENT:   Head: Normocephalic and atraumatic  Nose: Nose normal    Mouth/Throat: Oropharynx is clear and moist    Eyes: Conjunctivae are normal  Pupils are equal, round, and reactive to light  Cardiovascular: Normal rate, regular rhythm and normal heart sounds  No murmur heard  Pulmonary/Chest: Effort normal and breath sounds normal  No respiratory distress  She has no wheezes  Abdominal: Soft  Bowel sounds are normal  She exhibits no distension  There is no tenderness  Neurological: She is alert and oriented to person, place, and time  Skin: Skin is warm and dry  No rash noted  She is not diaphoretic  No erythema  Psychiatric: She has a normal mood and affect

## 2018-06-18 DIAGNOSIS — F41.9 ANXIETY: ICD-10-CM

## 2018-06-18 RX ORDER — ALPRAZOLAM 1 MG/1
1 TABLET ORAL 2 TIMES DAILY PRN
Qty: 60 TABLET | Refills: 0 | Status: SHIPPED | OUTPATIENT
Start: 2018-06-18 | End: 2018-10-04 | Stop reason: SDUPTHER

## 2018-10-04 DIAGNOSIS — F41.9 ANXIETY: ICD-10-CM

## 2018-10-04 RX ORDER — ALPRAZOLAM 1 MG/1
1 TABLET ORAL 2 TIMES DAILY PRN
Qty: 60 TABLET | Refills: 0 | Status: SHIPPED | OUTPATIENT
Start: 2018-10-04 | End: 2018-12-18 | Stop reason: SDUPTHER

## 2018-12-18 DIAGNOSIS — F41.9 ANXIETY: ICD-10-CM

## 2018-12-18 RX ORDER — ALPRAZOLAM 1 MG/1
1 TABLET ORAL 2 TIMES DAILY PRN
Qty: 60 TABLET | Refills: 1 | Status: SHIPPED | OUTPATIENT
Start: 2018-12-18 | End: 2019-03-26 | Stop reason: SDUPTHER

## 2019-03-26 DIAGNOSIS — F41.9 ANXIETY: ICD-10-CM

## 2019-03-26 DIAGNOSIS — F51.01 PRIMARY INSOMNIA: ICD-10-CM

## 2019-03-26 RX ORDER — ALPRAZOLAM 1 MG/1
1 TABLET ORAL 2 TIMES DAILY PRN
Qty: 60 TABLET | Refills: 1 | Status: SHIPPED | OUTPATIENT
Start: 2019-03-26 | End: 2020-01-02 | Stop reason: SDUPTHER

## 2019-03-26 RX ORDER — TRAZODONE HYDROCHLORIDE 150 MG/1
150 TABLET ORAL
Qty: 30 TABLET | Refills: 1 | Status: SHIPPED | OUTPATIENT
Start: 2019-03-26 | End: 2020-01-02 | Stop reason: ALTCHOICE

## 2019-04-30 ENCOUNTER — OFFICE VISIT (OUTPATIENT)
Dept: FAMILY MEDICINE CLINIC | Facility: CLINIC | Age: 39
End: 2019-04-30
Payer: COMMERCIAL

## 2019-04-30 VITALS
RESPIRATION RATE: 18 BRPM | DIASTOLIC BLOOD PRESSURE: 78 MMHG | HEIGHT: 65 IN | TEMPERATURE: 98.1 F | BODY MASS INDEX: 29.16 KG/M2 | SYSTOLIC BLOOD PRESSURE: 120 MMHG | WEIGHT: 175 LBS | HEART RATE: 99 BPM | OXYGEN SATURATION: 100 %

## 2019-04-30 DIAGNOSIS — J06.9 URI WITH COUGH AND CONGESTION: ICD-10-CM

## 2019-04-30 DIAGNOSIS — J01.00 ACUTE NON-RECURRENT MAXILLARY SINUSITIS: Primary | ICD-10-CM

## 2019-04-30 PROCEDURE — 99214 OFFICE O/P EST MOD 30 MIN: CPT | Performed by: NURSE PRACTITIONER

## 2019-04-30 PROCEDURE — 3008F BODY MASS INDEX DOCD: CPT | Performed by: NURSE PRACTITIONER

## 2019-04-30 RX ORDER — BROMPHENIRAMINE MALEATE, PSEUDOEPHEDRINE HYDROCHLORIDE, AND DEXTROMETHORPHAN HYDROBROMIDE 2; 30; 10 MG/5ML; MG/5ML; MG/5ML
5 SYRUP ORAL 4 TIMES DAILY PRN
Qty: 120 ML | Refills: 0 | Status: SHIPPED | OUTPATIENT
Start: 2019-04-30 | End: 2019-05-07

## 2019-04-30 RX ORDER — LEVOFLOXACIN 500 MG/1
500 TABLET, FILM COATED ORAL EVERY 24 HOURS
Qty: 7 TABLET | Refills: 0 | Status: SHIPPED | OUTPATIENT
Start: 2019-04-30 | End: 2019-05-07

## 2019-10-30 ENCOUNTER — HOSPITAL ENCOUNTER (EMERGENCY)
Facility: HOSPITAL | Age: 39
Discharge: HOME | End: 2019-10-30
Attending: EMERGENCY MEDICINE
Payer: COMMERCIAL

## 2019-10-30 ENCOUNTER — APPOINTMENT (EMERGENCY)
Dept: RADIOLOGY | Facility: HOSPITAL | Age: 39
End: 2019-10-30
Payer: COMMERCIAL

## 2019-10-30 VITALS
TEMPERATURE: 98.5 F | BODY MASS INDEX: 29.12 KG/M2 | OXYGEN SATURATION: 97 % | HEART RATE: 95 BPM | SYSTOLIC BLOOD PRESSURE: 112 MMHG | DIASTOLIC BLOOD PRESSURE: 56 MMHG | RESPIRATION RATE: 16 BRPM | HEIGHT: 65 IN

## 2019-10-30 DIAGNOSIS — R05.9 COUGH: Primary | ICD-10-CM

## 2019-10-30 DIAGNOSIS — R06.02 SHORTNESS OF BREATH: ICD-10-CM

## 2019-10-30 LAB
ALBUMIN SERPL BCP-MCNC: 3.9 G/DL (ref 3.5–5)
ALP SERPL-CCNC: 79 U/L (ref 46–116)
ALT SERPL W P-5'-P-CCNC: 20 U/L (ref 12–78)
ANION GAP SERPL CALCULATED.3IONS-SCNC: 18 MMOL/L (ref 4–13)
AST SERPL W P-5'-P-CCNC: 13 U/L (ref 5–45)
BASOPHILS # BLD AUTO: 0.02 THOUSANDS/ΜL (ref 0–0.1)
BASOPHILS NFR BLD AUTO: 0 % (ref 0–1)
BILIRUB SERPL-MCNC: 0.2 MG/DL (ref 0.2–1)
BUN SERPL-MCNC: 7 MG/DL (ref 5–25)
CALCIUM SERPL-MCNC: 8.8 MG/DL (ref 8.3–10.1)
CHLORIDE SERPL-SCNC: 107 MMOL/L (ref 100–108)
CO2 SERPL-SCNC: 16 MMOL/L (ref 21–32)
CREAT SERPL-MCNC: 0.76 MG/DL (ref 0.6–1.3)
EOSINOPHIL # BLD AUTO: 0 THOUSAND/ΜL (ref 0–0.61)
EOSINOPHIL NFR BLD AUTO: 0 % (ref 0–6)
ERYTHROCYTE [DISTWIDTH] IN BLOOD BY AUTOMATED COUNT: 15.2 % (ref 11.6–15.1)
EXT PREG TEST URINE: NEGATIVE
EXT. CONTROL ED NAV: NORMAL
GFR SERPL CREATININE-BSD FRML MDRD: 98 ML/MIN/1.73SQ M
GLUCOSE SERPL-MCNC: 117 MG/DL (ref 65–140)
HCT VFR BLD AUTO: 34.7 % (ref 34.8–46.1)
HGB BLD-MCNC: 11 G/DL (ref 11.5–15.4)
IMM GRANULOCYTES # BLD AUTO: 0.03 THOUSAND/UL (ref 0–0.2)
IMM GRANULOCYTES NFR BLD AUTO: 0 % (ref 0–2)
LYMPHOCYTES # BLD AUTO: 0.81 THOUSANDS/ΜL (ref 0.6–4.47)
LYMPHOCYTES NFR BLD AUTO: 10 % (ref 14–44)
MCH RBC QN AUTO: 26.8 PG (ref 26.8–34.3)
MCHC RBC AUTO-ENTMCNC: 31.7 G/DL (ref 31.4–37.4)
MCV RBC AUTO: 85 FL (ref 82–98)
MONOCYTES # BLD AUTO: 0.38 THOUSAND/ΜL (ref 0.17–1.22)
MONOCYTES NFR BLD AUTO: 5 % (ref 4–12)
NEUTROPHILS # BLD AUTO: 7.28 THOUSANDS/ΜL (ref 1.85–7.62)
NEUTS SEG NFR BLD AUTO: 85 % (ref 43–75)
NRBC BLD AUTO-RTO: 0 /100 WBCS
PLATELET # BLD AUTO: 359 THOUSANDS/UL (ref 149–390)
PMV BLD AUTO: 9.1 FL (ref 8.9–12.7)
POTASSIUM SERPL-SCNC: 3.8 MMOL/L (ref 3.5–5.3)
PROT SERPL-MCNC: 8 G/DL (ref 6.4–8.2)
RBC # BLD AUTO: 4.1 MILLION/UL (ref 3.81–5.12)
SODIUM SERPL-SCNC: 141 MMOL/L (ref 136–145)
WBC # BLD AUTO: 8.52 THOUSAND/UL (ref 4.31–10.16)

## 2019-10-30 PROCEDURE — 85025 COMPLETE CBC W/AUTO DIFF WBC: CPT | Performed by: EMERGENCY MEDICINE

## 2019-10-30 PROCEDURE — 96374 THER/PROPH/DIAG INJ IV PUSH: CPT

## 2019-10-30 PROCEDURE — 81025 URINE PREGNANCY TEST: CPT | Performed by: EMERGENCY MEDICINE

## 2019-10-30 PROCEDURE — 99284 EMERGENCY DEPT VISIT MOD MDM: CPT | Performed by: EMERGENCY MEDICINE

## 2019-10-30 PROCEDURE — 36415 COLL VENOUS BLD VENIPUNCTURE: CPT | Performed by: EMERGENCY MEDICINE

## 2019-10-30 PROCEDURE — 94640 AIRWAY INHALATION TREATMENT: CPT

## 2019-10-30 PROCEDURE — 96361 HYDRATE IV INFUSION ADD-ON: CPT

## 2019-10-30 PROCEDURE — 99285 EMERGENCY DEPT VISIT HI MDM: CPT

## 2019-10-30 PROCEDURE — 80053 COMPREHEN METABOLIC PANEL: CPT | Performed by: EMERGENCY MEDICINE

## 2019-10-30 PROCEDURE — 71046 X-RAY EXAM CHEST 2 VIEWS: CPT

## 2019-10-30 RX ORDER — HYDROCODONE POLISTIREX AND CHLORPHENIRAMINE POLISTIREX 10; 8 MG/5ML; MG/5ML
5 SUSPENSION, EXTENDED RELEASE ORAL EVERY 12 HOURS PRN
Status: DISCONTINUED | OUTPATIENT
Start: 2019-10-30 | End: 2019-10-31 | Stop reason: HOSPADM

## 2019-10-30 RX ORDER — KETOROLAC TROMETHAMINE 30 MG/ML
15 INJECTION, SOLUTION INTRAMUSCULAR; INTRAVENOUS ONCE
Status: COMPLETED | OUTPATIENT
Start: 2019-10-30 | End: 2019-10-30

## 2019-10-30 RX ORDER — IBUPROFEN 600 MG/1
600 TABLET ORAL EVERY 6 HOURS PRN
Qty: 20 TABLET | Refills: 0 | Status: SHIPPED | OUTPATIENT
Start: 2019-10-30 | End: 2020-01-02

## 2019-10-30 RX ORDER — HYDROCODONE POLISTIREX AND CHLORPHENIRAMINE POLISTIREX 10; 8 MG/5ML; MG/5ML
5 SUSPENSION, EXTENDED RELEASE ORAL EVERY 12 HOURS PRN
Qty: 20 ML | Refills: 0 | Status: SHIPPED | OUTPATIENT
Start: 2019-10-30 | End: 2020-01-02

## 2019-10-30 RX ORDER — ACETAMINOPHEN 325 MG/1
650 TABLET ORAL EVERY 6 HOURS PRN
Qty: 40 TABLET | Refills: 0 | Status: SHIPPED | OUTPATIENT
Start: 2019-10-30 | End: 2020-01-02

## 2019-10-30 RX ORDER — IPRATROPIUM BROMIDE AND ALBUTEROL SULFATE 2.5; .5 MG/3ML; MG/3ML
3 SOLUTION RESPIRATORY (INHALATION) ONCE
Status: COMPLETED | OUTPATIENT
Start: 2019-10-30 | End: 2019-10-30

## 2019-10-30 RX ADMIN — SODIUM CHLORIDE 1000 ML: 0.9 INJECTION, SOLUTION INTRAVENOUS at 21:02

## 2019-10-30 RX ADMIN — HYDROCODONE POLISTIREX AND CHLORPHENIRAMINE POLISTIREX 5 ML: 10; 8 SUSPENSION, EXTENDED RELEASE ORAL at 22:05

## 2019-10-30 RX ADMIN — KETOROLAC TROMETHAMINE 15 MG: 30 INJECTION, SOLUTION INTRAMUSCULAR at 21:49

## 2019-10-30 RX ADMIN — IPRATROPIUM BROMIDE AND ALBUTEROL SULFATE 3 ML: 2.5; .5 SOLUTION RESPIRATORY (INHALATION) at 21:49

## 2019-10-31 NOTE — ED NOTES
Patient transported to [address] [place] [address] ([facility] RN  10/30/19 0093

## 2019-10-31 NOTE — ED PROVIDER NOTES
Pt Name: An Luna  MRN: [id number]  [place] 1980  Age/Sex: 35 y o  female  Date of evaluation: 10/30/2019  PCP: Navneet Jorgensen MD    [address] [place] [address]    Chief Complaint   Patient presents with    Shortness of Breath     Patient c/o SOB, coughing, headache and fevers x 3 days  Patient given breathing tx and 40mg prednisone at urgent care  HPI    35 y o  female presenting with cough, headache, shortness of breath progressing over the last 3 days  She was seen at an urgent care and started on nebulizer as well as prednisone and given a 1st dose of a Z-Hamzah  Patient states her symptoms continued since then, leading her to present to the emergency department  She also complains of a fever and body aches  She denies sick contacts, trauma, vomiting, chest pain, other symptoms  HPI      Past Medical and Surgical History    Past Medical History:   Diagnosis Date    Anxiety     LAST ASSESSED: 8/30/16    Depression     Endometriosis     History of varicose veins     Sleep disturbances        Past Surgical History:   Procedure Laterality Date    HERNIA REPAIR      LAPAROSCOPIC INGUINAL HERNIA REPAIR      INITIAL INGUINAL HERNIA    LAPAROSCOPY      EXPLORATORY    TUBAL LIGATION         Family History   Problem Relation Age of Onset    Hepatitis Mother     Lupus Mother     Hyperlipidemia Father     Arthritis Family     Lung cancer Family         UNSPECIFIED LATERALITY, UNSPECIFIED PART OF LUNG       Social History     Tobacco Use    Smoking status: Current Every Day Smoker     Types: Cigarettes    Smokeless tobacco: Never Used    Tobacco comment: CURRENT TOBACCO USE   Substance Use Topics    Alcohol use: No     Comment: ALCOHOL USE AS PER ALLSCRIPTS    Drug use: No           Allergies    Allergies   Allergen Reactions    Bee Venom     Lidocaine Hives    Penicillins        Home Medications    Prior to Admission medications    Medication Sig Start Date End Date Taking?  Authorizing Provider   AL[facility]AZolam [facility] 1 mg tablet Take 1 tablet (1 mg total) by mouth 2 (two) times a day as needed for anxiety for up to 30 days 3/26/19 4/25/19  Rashaun Beasley MD   traZODone (DESYREL) 150 mg tablet Take 1 tablet (150 mg total) by mouth daily at bedtime for 30 days 3/26/19 4/25/19  Rashaun Beasley MD           Review of Systems    Review of Systems   Constitutional: Negative for activity change, chills and fever  HENT: Negative for drooling and facial swelling  Eyes: Negative for pain, discharge and visual disturbance  Respiratory: Positive for cough, shortness of breath and wheezing  Negative for apnea and chest tightness  Cardiovascular: Negative for chest pain and leg swelling  Gastrointestinal: Negative for abdominal pain, constipation, diarrhea, nausea and vomiting  Genitourinary: Negative for difficulty urinating, dysuria and urgency  Musculoskeletal: Negative for arthralgias, back pain and gait problem  Skin: Negative for color change and rash  Neurological: Negative for dizziness, speech difficulty, weakness and headaches  Psychiatric/Behavioral: Negative for agitation, behavioral problems and confusion  All other systems reviewed and negative  Physical Exam      ED Triage Vitals   Temperature Pulse Respirations Blood Pressure SpO2   10/30/19 2010 10/30/19 2010 10/30/19 2010 10/30/19 2010 10/30/19 2010   98 5 °F (36 9 °C) (!) 126 20 143/69 96 %      Temp Source Heart Rate Source Patient Position - Orthostatic VS BP Location FiO2 (%)   10/30/19 2010 10/30/19 2010 10/30/19 2130 10/30/19 2010 --   Oral Monitor Lying Left arm       Pain Score       10/30/19 2149       4               Physical Exam   Constitutional: She is oriented to person, place, and time  She appears well-developed and well-nourished  HENT:   Head: Normocephalic and atraumatic  Nose: Nose normal    Mouth/Throat: Oropharynx is clear and moist    Eyes: Pupils are equal, round, and reactive to light  Conjunctivae and EOM are normal    Neck: Normal range of motion  Neck supple  Cardiovascular: Normal rate, regular rhythm, normal heart sounds and intact distal pulses  Pulmonary/Chest: Effort normal  No respiratory distress  She has wheezes  She has no rales  Scattered wheezes, persistent cough, no focal lung sounds noted   Abdominal: Soft  She exhibits no distension  There is no tenderness  There is no rebound and no guarding  Musculoskeletal: Normal range of motion  She exhibits no edema or deformity  Neurological: She is alert and oriented to person, place, and time  Skin: Skin is warm and dry  No rash noted  No erythema  Psychiatric: She has a normal mood and affect  Her behavior is normal  Judgment and thought content normal    Nursing note and vitals reviewed             Diagnostic Results      Labs:    Results for orders placed or performed during the hospital encounter of 10/30/19   CBC and differential   Result Value Ref Range    WBC 8 52 4 31 - 10 16 Thousand/uL    RBC 4 10 3 81 - 5 12 Million/uL    Hemoglobin 11 0 (L) 11 5 - 15 4 g/dL    Hematocrit 34 7 (L) 34 8 - 46 1 %    MCV 85 82 - 98 fL    MCH 26 8 26 8 - 34 3 pg    MCHC 31 7 31 4 - 37 4 g/dL    RDW 15 2 (H) 11 6 - 15 1 %    MPV 9 1 8 9 - 12 7 fL    Platelets 972 747 - 641 Thousands/uL    nRBC 0 /100 WBCs    Neutrophils Relative 85 (H) 43 - 75 %    Immat GRANS % 0 0 - 2 %    Lymphocytes Relative 10 (L) 14 - 44 %    Monocytes Relative 5 4 - 12 %    Eosinophils Relative 0 0 - 6 %    Basophils Relative 0 0 - 1 %    Neutrophils Absolute 7 28 1 85 - 7 62 Thousands/µL    Immature Grans Absolute 0 03 0 00 - 0 20 Thousand/uL    Lymphocytes Absolute 0 81 0 60 - 4 47 Thousands/µL    Monocytes Absolute 0 38 0 17 - 1 22 Thousand/µL    Eosinophils Absolute 0 00 0 00 - 0 61 Thousand/µL    Basophils Absolute 0 02 0 00 - 0 10 Thousands/µL   Comprehensive metabolic panel   Result Value Ref Range    Sodium 141 136 - 145 mmol/L    Potassium 3 8 3 5 - 5 3 mmol/L    Chloride 107 100 - 108 mmol/L    CO2 16 (L) 21 - 32 mmol/L    ANION GAP 18 (H) 4 - 13 mmol/L    BUN 7 5 - 25 mg/dL    Creatinine 0 76 0 60 - 1 30 mg/dL    Glucose 117 65 - 140 mg/dL    Calcium 8 8 8 3 - 10 1 mg/dL    AST 13 5 - 45 U/L    ALT 20 12 - 78 U/L    Alkaline Phosphatase 79 46 - 116 U/L    Total Protein 8 0 6 4 - 8 2 g/dL    Albumin 3 9 3 5 - 5 0 g/dL    Total Bilirubin 0 20 0 20 - 1 00 mg/dL    eGFR 98 ml/min/1 73sq m   POCT pregnancy, urine   Result Value Ref Range    EXT PREG TEST UR (Ref: Negative) negative     Control vaild        All labs reviewed and utilized in the medical decision making process    Radiology:    XR chest 2 views   ED Interpretation   No acute disease      Final Result      No acute cardiopulmonary disease  Workstation performed: [id number]             All radiology studies independently viewed by me and interpreted by the radiologist     Procedure    Procedures        ED Course of Care and Re-Assessments      Symptoms improved with Toradol Tussionex and DuoNeb    Medications   hydrocodone-chlorpheniramine polistirex (TUSSIONEX) 10-8 mg/5 mL ER suspension 5 mL (5 mL Oral Given 10/30/19 2205)   ipratropium-albuterol (DUO-NEB) 0 5-2 5 mg/3 mL inhalation solution 3 mL (3 mL Nebulization Given 10/30/19 2149)   ketorolac (TORADOL) injection 15 mg (15 mg Intravenous Given 10/30/19 2149)   sodium chloride 0 9 % bolus 1,000 mL (0 mL Intravenous Stopped 10/30/19 2203)           FINAL IMPRESSION    Final diagnoses:   Cough   Shortness of breath         DISPOSITION/PLAN    Cough and shortness of breath as above  Vital signs remarkable for tachycardia initially that resolved symptomatic treatment  Respiratory distress resolved and cough substantially improved after treatment as above  Chest x-ray clear  Labs reassuring  Do not suspect sepsis, meningitis, bacterial pneumonia, meningitis, pneumothorax, epiglottitis, other acute life threat at this time    Hemodynamically stable and comfortable at time of discharge, follow up primary care doctor  I have reasonably determined that electronically prescribing a controlled substance would be impractical at this time due to anticipated difficulty or untimely delay in the patient obtaining the controlled substance, potentially resulting in an adverse impact on the patient's medical condition  As a result, the patient was provided with a paper prescription instead  Time reflects when diagnosis was documented in both MDM as applicable and the Disposition within this note     Time User Action Codes Description Comment    10/30/2019 10:40 PM Disha [place] Add [R05] Cough     10/30/2019 10:40 PM [place] Add [R06 02] Shortness of breath       ED Disposition     ED Disposition Condition Date/Time Comment    Discharge Stable Wed Oct 30, 2019 10:40 PM Hermes McIntyre discharge to home/self care              Follow-up Information     Follow up With Specialties Details Why Sobeida Antunez MD Family Medicine Go in 2 days To recheck your symptoms [address] [facility]  [phone]              PATIENT REFERRED TO:    Dayron Wick MD  [address] [place] [address]  [address]    Go in 2 days  To recheck your symptoms      DISCHARGE MEDICATIONS:    Patient's Medications   Discharge Prescriptions    ACETAMINOPHEN (TYLENOL) 325 MG TABLET    Take 2 tablets (650 mg total) by mouth every 6 (six) hours as needed for mild pain       Start Date: 10/30/2019End Date: --       Order Dose: 650 mg       Quantity: 40 tablet    Refills: 0    HYDROCODONE-CHLORPHENIRAMINE POLISTIREX (TUSSIONEX) 10-8 MG/5 ML ER SUSPENSION    Take 5 mL by mouth every 12 (twelve) hours as needed for cough for up to 4 dosesMax Daily Amount: 10 mL       Start Date: 10/30/2019End Date: --       Order Dose: 5 mL       Quantity: 20 mL    Refills: 0    IBUPROFEN (MOTRIN) 600 MG TABLET    Take 1 tablet (600 mg total) by mouth every 6 (six) hours as needed for moderate pain       Start Date: 10/30/2019End Date: --       Order Dose: 600 mg       Quantity: 20 tablet    Refills: 0       No discharge procedures on file           MD Jesus Schneider MD  10/30/19 5995

## 2020-01-02 ENCOUNTER — OFFICE VISIT (OUTPATIENT)
Dept: FAMILY MEDICINE CLINIC | Facility: CLINIC | Age: 40
End: 2020-01-02
Payer: COMMERCIAL

## 2020-01-02 ENCOUNTER — HOSPITAL ENCOUNTER (OUTPATIENT)
Dept: ULTRASOUND IMAGING | Facility: HOSPITAL | Age: 40
Discharge: HOME | End: 2020-01-02
Payer: COMMERCIAL

## 2020-01-02 ENCOUNTER — APPOINTMENT (OUTPATIENT)
Dept: LAB | Facility: HOSPITAL | Age: 40
End: 2020-01-02
Payer: COMMERCIAL

## 2020-01-02 VITALS
WEIGHT: 193 LBS | DIASTOLIC BLOOD PRESSURE: 82 MMHG | SYSTOLIC BLOOD PRESSURE: 126 MMHG | TEMPERATURE: 98.5 F | BODY MASS INDEX: 32.15 KG/M2 | HEART RATE: 94 BPM | HEIGHT: 65 IN | OXYGEN SATURATION: 98 %

## 2020-01-02 DIAGNOSIS — R10.9 ACUTE RIGHT FLANK PAIN: Primary | ICD-10-CM

## 2020-01-02 DIAGNOSIS — R10.11 RIGHT UPPER QUADRANT PAIN: ICD-10-CM

## 2020-01-02 DIAGNOSIS — R10.9 ACUTE RIGHT FLANK PAIN: ICD-10-CM

## 2020-01-02 DIAGNOSIS — Z79.899 BENZODIAZEPINE USE AGREEMENT EXISTS: ICD-10-CM

## 2020-01-02 DIAGNOSIS — F51.01 PRIMARY INSOMNIA: ICD-10-CM

## 2020-01-02 DIAGNOSIS — Z12.31 VISIT FOR SCREENING MAMMOGRAM: ICD-10-CM

## 2020-01-02 DIAGNOSIS — F41.9 ANXIETY: ICD-10-CM

## 2020-01-02 DIAGNOSIS — R05.9 COUGH: ICD-10-CM

## 2020-01-02 PROBLEM — J01.00 ACUTE NON-RECURRENT MAXILLARY SINUSITIS: Status: RESOLVED | Noted: 2019-04-30 | Resolved: 2020-01-02

## 2020-01-02 PROBLEM — J06.9 URI WITH COUGH AND CONGESTION: Status: RESOLVED | Noted: 2019-04-30 | Resolved: 2020-01-02

## 2020-01-02 LAB
ALBUMIN SERPL BCP-MCNC: 3.9 G/DL (ref 3.5–5)
ALP SERPL-CCNC: 74 U/L (ref 46–116)
ALT SERPL W P-5'-P-CCNC: 16 U/L (ref 12–78)
ANION GAP SERPL CALCULATED.3IONS-SCNC: 12 MMOL/L (ref 4–13)
AST SERPL W P-5'-P-CCNC: 17 U/L (ref 5–45)
BASOPHILS # BLD AUTO: 0.06 THOUSANDS/ΜL (ref 0–0.1)
BASOPHILS NFR BLD AUTO: 1 % (ref 0–1)
BILIRUB SERPL-MCNC: 0.3 MG/DL (ref 0.2–1)
BUN SERPL-MCNC: 9 MG/DL (ref 5–25)
CALCIUM SERPL-MCNC: 9 MG/DL (ref 8.3–10.1)
CHLORIDE SERPL-SCNC: 103 MMOL/L (ref 100–108)
CO2 SERPL-SCNC: 25 MMOL/L (ref 21–32)
CREAT SERPL-MCNC: 0.65 MG/DL (ref 0.6–1.3)
EOSINOPHIL # BLD AUTO: 0.1 THOUSAND/ΜL (ref 0–0.61)
EOSINOPHIL NFR BLD AUTO: 1 % (ref 0–6)
ERYTHROCYTE [DISTWIDTH] IN BLOOD BY AUTOMATED COUNT: 17.3 % (ref 11.6–15.1)
GFR SERPL CREATININE-BSD FRML MDRD: 111 ML/MIN/1.73SQ M
GLUCOSE SERPL-MCNC: 82 MG/DL (ref 65–140)
HCT VFR BLD AUTO: 33.7 % (ref 34.8–46.1)
HGB BLD-MCNC: 10.3 G/DL (ref 11.5–15.4)
IMM GRANULOCYTES # BLD AUTO: 0.03 THOUSAND/UL (ref 0–0.2)
IMM GRANULOCYTES NFR BLD AUTO: 0 % (ref 0–2)
LYMPHOCYTES # BLD AUTO: 2.91 THOUSANDS/ΜL (ref 0.6–4.47)
LYMPHOCYTES NFR BLD AUTO: 32 % (ref 14–44)
MCH RBC QN AUTO: 25.6 PG (ref 26.8–34.3)
MCHC RBC AUTO-ENTMCNC: 30.6 G/DL (ref 31.4–37.4)
MCV RBC AUTO: 84 FL (ref 82–98)
MONOCYTES # BLD AUTO: 0.68 THOUSAND/ΜL (ref 0.17–1.22)
MONOCYTES NFR BLD AUTO: 8 % (ref 4–12)
NEUTROPHILS # BLD AUTO: 5.2 THOUSANDS/ΜL (ref 1.85–7.62)
NEUTS SEG NFR BLD AUTO: 58 % (ref 43–75)
NRBC BLD AUTO-RTO: 0 /100 WBCS
PLATELET # BLD AUTO: 448 THOUSANDS/UL (ref 149–390)
PMV BLD AUTO: 9.3 FL (ref 8.9–12.7)
POTASSIUM SERPL-SCNC: 3.9 MMOL/L (ref 3.5–5.3)
PROT SERPL-MCNC: 7.7 G/DL (ref 6.4–8.2)
RBC # BLD AUTO: 4.03 MILLION/UL (ref 3.81–5.12)
SL AMB  POCT GLUCOSE, UA: NEGATIVE
SL AMB LEUKOCYTE ESTERASE,UA: NEGATIVE
SL AMB POCT BILIRUBIN,UA: NEGATIVE
SL AMB POCT BLOOD,UA: NEGATIVE
SL AMB POCT CLARITY,UA: CLEAR
SL AMB POCT COLOR,UA: YELLOW
SL AMB POCT KETONES,UA: NEGATIVE
SL AMB POCT NITRITE,UA: NEGATIVE
SL AMB POCT PH,UA: 7
SL AMB POCT SPECIFIC GRAVITY,UA: 1.01
SL AMB POCT URINE PROTEIN: NEGATIVE
SL AMB POCT UROBILINOGEN: 0.2
SODIUM SERPL-SCNC: 140 MMOL/L (ref 136–145)
WBC # BLD AUTO: 8.98 THOUSAND/UL (ref 4.31–10.16)

## 2020-01-02 PROCEDURE — 3008F BODY MASS INDEX DOCD: CPT | Performed by: FAMILY MEDICINE

## 2020-01-02 PROCEDURE — 36415 COLL VENOUS BLD VENIPUNCTURE: CPT

## 2020-01-02 PROCEDURE — 81003 URINALYSIS AUTO W/O SCOPE: CPT | Performed by: FAMILY MEDICINE

## 2020-01-02 PROCEDURE — 85025 COMPLETE CBC W/AUTO DIFF WBC: CPT

## 2020-01-02 PROCEDURE — 99214 OFFICE O/P EST MOD 30 MIN: CPT | Performed by: FAMILY MEDICINE

## 2020-01-02 PROCEDURE — 80053 COMPREHEN METABOLIC PANEL: CPT

## 2020-01-02 PROCEDURE — 76705 ECHO EXAM OF ABDOMEN: CPT

## 2020-01-02 RX ORDER — IBUPROFEN 600 MG/1
600 TABLET ORAL EVERY 6 HOURS PRN
Qty: 30 TABLET | Refills: 0 | Status: SHIPPED | OUTPATIENT
Start: 2020-01-02 | End: 2021-06-12 | Stop reason: HOSPADM

## 2020-01-02 RX ORDER — IBUPROFEN 600 MG/1
600 TABLET ORAL EVERY 6 HOURS PRN
Qty: 60 TABLET | Refills: 0 | Status: SHIPPED | OUTPATIENT
Start: 2020-01-02 | End: 2021-06-12 | Stop reason: HOSPADM

## 2020-01-02 RX ORDER — BUSPIRONE HYDROCHLORIDE 7.5 MG/1
7.5 TABLET ORAL 2 TIMES DAILY
Qty: 60 TABLET | Refills: 5 | Status: SHIPPED | OUTPATIENT
Start: 2020-01-02 | End: 2021-06-12 | Stop reason: HOSPADM

## 2020-01-02 RX ORDER — CYCLOBENZAPRINE HCL 5 MG
5 TABLET ORAL 3 TIMES DAILY PRN
Qty: 30 TABLET | Refills: 0 | Status: SHIPPED | OUTPATIENT
Start: 2020-01-02 | End: 2021-06-12 | Stop reason: HOSPADM

## 2020-01-02 RX ORDER — ALPRAZOLAM 1 MG/1
1 TABLET ORAL 2 TIMES DAILY PRN
Qty: 60 TABLET | Refills: 1 | Status: SHIPPED | OUTPATIENT
Start: 2020-01-02 | End: 2020-09-29 | Stop reason: SDUPTHER

## 2020-01-02 NOTE — PROGRESS NOTES
Assessment/Plan:         Problem List Items Addressed This Visit        Other    Anxiety     Start BuSpar 7 5 mg b i d  Xanax refilled  urine drug screen and contract signed today  Referral to therapy  Trazodone has not been helpful so she will stop this  Return in 4 weeks for re-evaluation           Relevant Medications    busPIRone (BUSPAR) 7 5 mg tablet    ALPRAZolam (XANAX) 1 mg tablet    Other Relevant Orders    Ambulatory referral to [facility]    Primary insomnia    Acute right flank pain - Primary    Relevant Orders    POCT urine dip auto non-scope (Completed)    US right upper quadrant    CBC and differential    Comprehensive metabolic panel    Benzodiazepine use agreement exists    RESOLVED: Right upper quadrant pain      Other Visit Diagnoses     Visit for screening mammogram        Relevant Orders    Mammo screening bilateral w cad            Subjective:      Patient ID: Hermes Brock is a 35 y o  female  She is having R flank pain - shooting pains up into the shoulder blades  Started 3 days ago  Movement of the R arm and twisting hurts  She has nausea and some pain after eating  Cramping and gassiness after eating  No vomiting  She has diarrhea  Her appetite is decreased  She reports weight gain over the past few months  Denies fevers  She has endometriosis and is on her menstrual cycle currently  Denies any urinary symptoms  She tried Tums and Pepto Bismol which did not help  She is on Midol for menstrual cramps  She also is asking about anxiety  She is on Trazodone and Xanax  She says she has had increased anxiety lately and panic attacks  She takes Xanax very sparingly  She says Trazodone is not helping her sleep anymore  She has been on Wellbutrin which she says didn't work  She says Lexapro gave her side effects  She says she saw a therapist in the past but did not find it helpful  She says her anxiety is so bad recently that she had to quit her job        The following portions of the patient's history were reviewed and updated as appropriate:   She  has a past medical history of Anxiety, Depression, Endometriosis, History of varicose veins, and Sleep disturbances  She   Patient Active Problem List    Diagnosis Date Noted    Acute right flank pain 01/02/2020    Benzodiazepine use agreement exists 01/02/2020    Anxiety 04/19/2018    Primary insomnia 04/19/2018    Current every day smoker 04/19/2018    Diabetes mellitus screening 04/19/2018    Need for lipid screening 04/19/2018     She  has a past surgical history that includes LAPAROSCOPY; Tubal ligation; Hernia repair; and Laparoscopic inguinal hernia repair  Her family history includes Arthritis in her family; Hepatitis in her mother; Hyperlipidemia in her father; Lung cancer in her family; Lupus in her mother  She  reports that she has been smoking cigarettes  She has never used smokeless tobacco  She reports that she does not drink alcohol or use drugs  Current Outpatient Medications   Medication Sig Dispense Refill    ALPRAZolam (XANAX) 1 mg tablet Take 1 tablet (1 mg total) by mouth 2 (two) times a day as needed for anxiety 60 tablet 1    busPIRone (BUSPAR) 7 5 mg tablet Take 1 tablet (7 5 mg total) by mouth 2 (two) times a day 60 tablet 5     No current facility-administered medications for this visit        Current Outpatient Medications on File Prior to Visit   Medication Sig    [DISCONTINUED] acetaminophen (TYLENOL) 325 mg tablet Take 2 tablets (650 mg total) by mouth every 6 (six) hours as needed for mild pain    [DISCONTINUED] ALPRAZolam (XANAX) 1 mg tablet Take 1 tablet (1 mg total) by mouth 2 (two) times a day as needed for anxiety for up to 30 days    [DISCONTINUED] hydrocodone-chlorpheniramine polistirex (TUSSIONEX) 10-8 mg/5 mL ER suspension Take 5 mL by mouth every 12 (twelve) hours as needed for cough for up to 4 dosesMax Daily Amount: 10 mL    [DISCONTINUED] ibuprofen (MOTRIN) 600 mg tablet Take 1 tablet (600 mg total) by mouth every 6 (six) hours as needed for moderate pain    [DISCONTINUED] traZODone (DESYREL) 150 mg tablet Take 1 tablet (150 mg total) by mouth daily at bedtime for 30 days     No current facility-administered medications on file prior to visit  She is allergic to bee venom; lidocaine; and penicillins       Review of Systems   Constitutional: Negative  HENT: Negative  Eyes: Negative  Gastrointestinal: Positive for abdominal pain, diarrhea and nausea  Negative for abdominal distention, anal bleeding, blood in stool, constipation, rectal pain and vomiting  Genitourinary: Negative  Allergic/Immunologic: Negative  Neurological: Negative  Hematological: Negative  Psychiatric/Behavioral: Positive for sleep disturbance  Negative for self-injury and suicidal ideas  The patient is nervous/anxious  Objective:      /82   Pulse 94   Temp 98 5 °F (36 9 °C)   Ht 5' 5" (1 651 m)   Wt 87 5 kg (193 lb)   SpO2 98%   BMI 32 12 kg/m²          Physical Exam   Constitutional: She is oriented to person, place, and time  She appears well-developed and well-nourished  No distress  HENT:   Head: Normocephalic and atraumatic  Eyes: Pupils are equal, round, and reactive to light  Conjunctivae are normal    Neck: Neck supple  Cardiovascular: Normal rate, regular rhythm and normal heart sounds  Exam reveals no gallop and no friction rub  No murmur heard  Pulmonary/Chest: Effort normal and breath sounds normal  No respiratory distress  She has no wheezes  She has no rales  She exhibits no tenderness  Abdominal: There is generalized tenderness and tenderness in the right upper quadrant  Musculoskeletal: Normal range of motion  She exhibits no edema  Lumbar back: She exhibits tenderness and pain  She exhibits normal range of motion  Back:    Neurological: She is alert and oriented to person, place, and time     Skin: Skin is warm and dry  No rash noted  She is not diaphoretic  Psychiatric: She has a normal mood and affect  Her behavior is normal  Judgment and thought content normal    Nursing note and vitals reviewed

## 2020-01-02 NOTE — ASSESSMENT & PLAN NOTE
Start BuSpar 7 5 mg b i d  Xanax refilled  urine drug screen and contract signed today  Referral to therapy  Trazodone has not been helpful so she will stop this    Return in 4 weeks for re-evaluation

## 2020-01-02 NOTE — PROGRESS NOTES
BMI Counseling: Body mass index is 32 12 kg/m²  The BMI is above normal  Nutrition recommendations include 3-5 servings of fruits/vegetables daily, reducing fast food intake, consuming healthier snacks and decreasing soda and/or juice intake  Exercise recommendations include exercising 3-5 times per week

## 2020-01-10 ENCOUNTER — SOCIAL WORK (OUTPATIENT)
Dept: BEHAVIORAL/MENTAL HEALTH CLINIC | Facility: CLINIC | Age: 40
End: 2020-01-10
Payer: COMMERCIAL

## 2020-01-10 DIAGNOSIS — F43.10 PTSD (POST-TRAUMATIC STRESS DISORDER): Primary | ICD-10-CM

## 2020-01-10 PROCEDURE — 90834 PSYTX W PT 45 MINUTES: CPT | Performed by: SOCIAL WORKER
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
## 2020-01-24 ENCOUNTER — SOCIAL WORK (OUTPATIENT)
Dept: BEHAVIORAL/MENTAL HEALTH CLINIC | Facility: CLINIC | Age: 40
End: 2020-01-24
Payer: COMMERCIAL

## 2020-01-24 DIAGNOSIS — F43.10 PTSD (POST-TRAUMATIC STRESS DISORDER): Primary | ICD-10-CM

## 2020-01-24 PROCEDURE — 90834 PSYTX W PT 45 MINUTES: CPT | Performed by: SOCIAL WORKER
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
## 2020-02-06 ENCOUNTER — TELEPHONE (OUTPATIENT)
Dept: BEHAVIORAL/MENTAL HEALTH CLINIC | Facility: CLINIC | Age: 40
End: 2020-02-06

## 2020-02-27 ENCOUNTER — SOCIAL WORK (OUTPATIENT)
Dept: BEHAVIORAL/MENTAL HEALTH CLINIC | Facility: CLINIC | Age: 40
End: 2020-02-27
Payer: COMMERCIAL

## 2020-02-27 DIAGNOSIS — F43.10 PTSD (POST-TRAUMATIC STRESS DISORDER): Primary | ICD-10-CM

## 2020-02-27 PROCEDURE — 90834 PSYTX W PT 45 MINUTES: CPT | Performed by: SOCIAL WORKER
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
## 2020-03-19 ENCOUNTER — TELEPHONE (OUTPATIENT)
Dept: BEHAVIORAL/MENTAL HEALTH CLINIC | Facility: CLINIC | Age: 40
End: 2020-03-19

## 2020-09-29 ENCOUNTER — OFFICE VISIT (OUTPATIENT)
Dept: FAMILY MEDICINE CLINIC | Facility: CLINIC | Age: 40
End: 2020-09-29
Payer: COMMERCIAL

## 2020-09-29 VITALS
DIASTOLIC BLOOD PRESSURE: 74 MMHG | HEART RATE: 100 BPM | OXYGEN SATURATION: 99 % | SYSTOLIC BLOOD PRESSURE: 116 MMHG | BODY MASS INDEX: 28.72 KG/M2 | RESPIRATION RATE: 16 BRPM | TEMPERATURE: 97.5 F | WEIGHT: 172.4 LBS | HEIGHT: 65 IN

## 2020-09-29 DIAGNOSIS — N95.1 HOT FLASHES DUE TO MENOPAUSE: ICD-10-CM

## 2020-09-29 DIAGNOSIS — F41.9 ANXIETY: Primary | ICD-10-CM

## 2020-09-29 DIAGNOSIS — Z13.220 NEED FOR LIPID SCREENING: ICD-10-CM

## 2020-09-29 DIAGNOSIS — Z13.1 DIABETES MELLITUS SCREENING: ICD-10-CM

## 2020-09-29 DIAGNOSIS — R53.83 FATIGUE, UNSPECIFIED TYPE: ICD-10-CM

## 2020-09-29 DIAGNOSIS — M54.9 MID BACK PAIN: ICD-10-CM

## 2020-09-29 PROCEDURE — 99214 OFFICE O/P EST MOD 30 MIN: CPT | Performed by: FAMILY MEDICINE

## 2020-09-29 RX ORDER — ALPRAZOLAM 1 MG/1
1 TABLET ORAL 2 TIMES DAILY PRN
Qty: 60 TABLET | Refills: 1 | Status: SHIPPED | OUTPATIENT
Start: 2020-09-29 | End: 2021-04-28 | Stop reason: SDUPTHER

## 2020-09-29 RX ORDER — PAROXETINE 10 MG/1
10 TABLET, FILM COATED ORAL
Qty: 30 TABLET | Refills: 2 | Status: SHIPPED | OUTPATIENT
Start: 2020-09-29 | End: 2021-04-28 | Stop reason: SDUPTHER

## 2020-09-29 RX ORDER — IBUPROFEN 800 MG/1
800 TABLET ORAL EVERY 8 HOURS PRN
Qty: 30 TABLET | Refills: 2 | Status: SHIPPED | OUTPATIENT
Start: 2020-09-29 | End: 2021-02-25

## 2020-09-29 NOTE — PROGRESS NOTES
Assessment/Plan:    No problem-specific Assessment & Plan notes found for this encounter  Diagnoses and all orders for this visit:    Anxiety  After discussing risks and benefits of medication along with side effects will refill  PDMP checked and appropriate  Controlled substance agreement on file  -     ALPRAZolam (XANAX) 1 mg tablet; Take 1 tablet (1 mg total) by mouth 2 (two) times a day as needed for anxiety    Diabetes mellitus screening  -     Comprehensive metabolic panel; Future    Need for lipid screening  -     Lipid panel; Future    Fatigue, unspecified type  -     CBC and differential; Future  -     TSH, 3rd generation with Free T4 reflex; Future  -     Iron; Future    Hot flashes due to menopause  After discussing risks and benefits of medication along with side effects will start the following:  -     PARoxetine (PAXIL) 10 mg tablet; Take 1 tablet (10 mg total) by mouth daily at bedtime    Mid back pain  -     Ambulatory referral to Physical Therapy; Future  -     XR spine thoracic 3 vw; Future  -     ibuprofen (MOTRIN) 800 mg tablet; Take 1 tablet (800 mg total) by mouth every 8 (eight) hours as needed for mild pain      Follow up in 1-2 months    Subjective:      Patient ID: Regaan Pascal is a 38 y o  female  Patient is here to follow up on anxiety  She takes Xanax twice daily denies any side effects  Her symptoms are controlled with this medication  She was found to have anemia per last cbc, Hgb of 10  No recent repeat Cbc  She feels fatigued at times  Also she has been having increased sweating at night along with irregular periods says that her mom had menopause at age 40  Also has been having mid back pain, dull no radiation  No recent injuries  The following portions of the patient's history were reviewed and updated as appropriate:   She  has a past medical history of Anxiety, Depression, Endometriosis, History of varicose veins, and Sleep disturbances    She   Patient Active Problem List    Diagnosis Date Noted    Fatigue 09/29/2020    Hot flashes due to menopause 09/29/2020    Mid back pain 09/29/2020    PTSD (post-traumatic stress disorder) 01/10/2020    Acute right flank pain 01/02/2020    Benzodiazepine use agreement exists 01/02/2020    Anxiety 04/19/2018    Primary insomnia 04/19/2018    Current every day smoker 04/19/2018    Diabetes mellitus screening 04/19/2018    Need for lipid screening 04/19/2018     She  has a past surgical history that includes LAPAROSCOPY; Tubal ligation; Hernia repair; and Laparoscopic inguinal hernia repair  Her family history includes Arthritis in her family; Hepatitis in her mother; Hyperlipidemia in her father; Lung cancer in her family; Lupus in her mother  She  reports that she has been smoking cigarettes  She has never used smokeless tobacco  She reports that she does not drink alcohol or use drugs    Current Outpatient Medications   Medication Sig Dispense Refill    ALPRAZolam (XANAX) 1 mg tablet Take 1 tablet (1 mg total) by mouth 2 (two) times a day as needed for anxiety 60 tablet 1    busPIRone (BUSPAR) 7 5 mg tablet Take 1 tablet (7 5 mg total) by mouth 2 (two) times a day (Patient not taking: Reported on 9/29/2020) 60 tablet 5    cyclobenzaprine (FLEXERIL) 5 mg tablet Take 1 tablet (5 mg total) by mouth 3 (three) times a day as needed for muscle spasms (Patient not taking: Reported on 9/29/2020) 30 tablet 0    ibuprofen (MOTRIN) 600 mg tablet Take 1 tablet (600 mg total) by mouth every 6 (six) hours as needed for moderate pain (Patient not taking: Reported on 9/29/2020) 60 tablet 0    ibuprofen (MOTRIN) 600 mg tablet Take 1 tablet (600 mg total) by mouth every 6 (six) hours as needed for mild pain (Patient not taking: Reported on 9/29/2020) 30 tablet 0    ibuprofen (MOTRIN) 800 mg tablet Take 1 tablet (800 mg total) by mouth every 8 (eight) hours as needed for mild pain 30 tablet 2    PARoxetine (PAXIL) 10 mg tablet Take 1 tablet (10 mg total) by mouth daily at bedtime 30 tablet 2     No current facility-administered medications for this visit  Current Outpatient Medications on File Prior to Visit   Medication Sig    [DISCONTINUED] ALPRAZolam (XANAX) 1 mg tablet Take 1 tablet (1 mg total) by mouth 2 (two) times a day as needed for anxiety    busPIRone (BUSPAR) 7 5 mg tablet Take 1 tablet (7 5 mg total) by mouth 2 (two) times a day (Patient not taking: Reported on 9/29/2020)    cyclobenzaprine (FLEXERIL) 5 mg tablet Take 1 tablet (5 mg total) by mouth 3 (three) times a day as needed for muscle spasms (Patient not taking: Reported on 9/29/2020)    ibuprofen (MOTRIN) 600 mg tablet Take 1 tablet (600 mg total) by mouth every 6 (six) hours as needed for moderate pain (Patient not taking: Reported on 9/29/2020)    ibuprofen (MOTRIN) 600 mg tablet Take 1 tablet (600 mg total) by mouth every 6 (six) hours as needed for mild pain (Patient not taking: Reported on 9/29/2020)     No current facility-administered medications on file prior to visit  She is allergic to bee venom; lidocaine; and penicillins       Review of Systems   Constitutional: Positive for fatigue  Negative for activity change, appetite change and fever  HENT: Negative for congestion and ear discharge  Respiratory: Negative for cough and shortness of breath  Cardiovascular: Negative for chest pain and palpitations  Gastrointestinal: Negative for diarrhea and nausea  Musculoskeletal: Positive for back pain and myalgias  Negative for arthralgias  Skin: Negative for color change and rash  Neurological: Negative for dizziness and headaches  Psychiatric/Behavioral: Negative for agitation and behavioral problems  The patient is nervous/anxious and is hyperactive            Objective:      /74 (BP Location: Left arm, Patient Position: Sitting, Cuff Size: Adult)   Pulse 100   Temp 97 5 °F (36 4 °C)   Resp 16   Ht 5' 5" (1 651 m)   Wt 78 2 kg (172 lb 6 4 oz)   SpO2 99%   BMI 28 69 kg/m²          Physical Exam  Constitutional:       General: She is not in acute distress  Appearance: She is well-developed  She is not diaphoretic  HENT:      Head: Normocephalic and atraumatic  Nose: Nose normal    Eyes:      Conjunctiva/sclera: Conjunctivae normal       Pupils: Pupils are equal, round, and reactive to light  Cardiovascular:      Rate and Rhythm: Normal rate and regular rhythm  Heart sounds: Normal heart sounds  No murmur  Pulmonary:      Effort: Pulmonary effort is normal  No respiratory distress  Breath sounds: Normal breath sounds  No wheezing  Abdominal:      General: Bowel sounds are normal  There is no distension  Palpations: Abdomen is soft  Tenderness: There is no abdominal tenderness  Skin:     General: Skin is warm and dry  Findings: No erythema or rash  Neurological:      Mental Status: She is alert and oriented to person, place, and time

## 2020-10-22 ENCOUNTER — SOCIAL WORK (OUTPATIENT)
Dept: BEHAVIORAL/MENTAL HEALTH CLINIC | Facility: CLINIC | Age: 40
End: 2020-10-22
Payer: COMMERCIAL

## 2020-10-22 DIAGNOSIS — F43.10 PTSD (POST-TRAUMATIC STRESS DISORDER): Primary | ICD-10-CM

## 2020-10-22 PROCEDURE — 90834 PSYTX W PT 45 MINUTES: CPT | Performed by: SOCIAL WORKER

## 2020-11-10 ENCOUNTER — TELEMEDICINE (OUTPATIENT)
Dept: BEHAVIORAL/MENTAL HEALTH CLINIC | Age: 40
End: 2020-11-10
Payer: COMMERCIAL

## 2020-11-10 DIAGNOSIS — F43.10 PTSD (POST-TRAUMATIC STRESS DISORDER): Primary | ICD-10-CM

## 2020-11-10 DIAGNOSIS — F41.9 ANXIETY: ICD-10-CM

## 2020-11-10 PROCEDURE — 90834 PSYTX W PT 45 MINUTES: CPT | Performed by: SOCIAL WORKER

## 2020-11-27 ENCOUNTER — SOCIAL WORK (OUTPATIENT)
Dept: BEHAVIORAL/MENTAL HEALTH CLINIC | Facility: CLINIC | Age: 40
End: 2020-11-27
Payer: COMMERCIAL

## 2020-11-27 DIAGNOSIS — F43.10 PTSD (POST-TRAUMATIC STRESS DISORDER): Primary | ICD-10-CM

## 2020-11-27 PROCEDURE — 90834 PSYTX W PT 45 MINUTES: CPT | Performed by: SOCIAL WORKER

## 2020-12-04 ENCOUNTER — SOCIAL WORK (OUTPATIENT)
Dept: BEHAVIORAL/MENTAL HEALTH CLINIC | Facility: CLINIC | Age: 40
End: 2020-12-04
Payer: COMMERCIAL

## 2020-12-04 DIAGNOSIS — F43.10 PTSD (POST-TRAUMATIC STRESS DISORDER): Primary | ICD-10-CM

## 2020-12-04 DIAGNOSIS — F41.9 ANXIETY: ICD-10-CM

## 2020-12-04 PROCEDURE — 90834 PSYTX W PT 45 MINUTES: CPT | Performed by: SOCIAL WORKER

## 2020-12-15 ENCOUNTER — TELEMEDICINE (OUTPATIENT)
Dept: BEHAVIORAL/MENTAL HEALTH CLINIC | Age: 40
End: 2020-12-15
Payer: COMMERCIAL

## 2020-12-15 DIAGNOSIS — F41.1 GAD (GENERALIZED ANXIETY DISORDER): Primary | ICD-10-CM

## 2020-12-15 DIAGNOSIS — F43.10 PTSD (POST-TRAUMATIC STRESS DISORDER): ICD-10-CM

## 2020-12-15 PROCEDURE — 90834 PSYTX W PT 45 MINUTES: CPT | Performed by: SOCIAL WORKER

## 2020-12-30 ENCOUNTER — TELEMEDICINE (OUTPATIENT)
Dept: FAMILY MEDICINE CLINIC | Facility: CLINIC | Age: 40
End: 2020-12-30
Payer: COMMERCIAL

## 2020-12-30 DIAGNOSIS — R09.81 NASAL CONGESTION: Primary | ICD-10-CM

## 2020-12-30 DIAGNOSIS — R09.81 NASAL CONGESTION: ICD-10-CM

## 2020-12-30 PROCEDURE — 99213 OFFICE O/P EST LOW 20 MIN: CPT | Performed by: NURSE PRACTITIONER

## 2020-12-30 PROCEDURE — 87637 SARSCOV2&INF A&B&RSV AMP PRB: CPT | Performed by: NURSE PRACTITIONER

## 2021-01-01 LAB
FLUAV RNA NPH QL NAA+PROBE: NOT DETECTED
FLUBV RNA NPH QL NAA+PROBE: NOT DETECTED
RSV RNA NPH QL NAA+PROBE: NOT DETECTED
SARS-COV-2 RNA NPH QL NAA+PROBE: NOT DETECTED

## 2021-02-25 DIAGNOSIS — M54.9 MID BACK PAIN: ICD-10-CM

## 2021-02-25 RX ORDER — IBUPROFEN 800 MG/1
TABLET ORAL
Qty: 30 TABLET | Refills: 2 | Status: SHIPPED | OUTPATIENT
Start: 2021-02-25 | End: 2021-06-07

## 2021-04-28 DIAGNOSIS — N95.1 HOT FLASHES DUE TO MENOPAUSE: ICD-10-CM

## 2021-04-28 DIAGNOSIS — F41.9 ANXIETY: ICD-10-CM

## 2021-04-28 RX ORDER — ALPRAZOLAM 1 MG/1
1 TABLET ORAL 2 TIMES DAILY PRN
Qty: 60 TABLET | Refills: 1 | Status: SHIPPED | OUTPATIENT
Start: 2021-04-28 | End: 2021-06-11

## 2021-04-28 RX ORDER — PAROXETINE 10 MG/1
10 TABLET, FILM COATED ORAL
Qty: 30 TABLET | Refills: 2 | Status: SHIPPED | OUTPATIENT
Start: 2021-04-28

## 2021-05-27 ENCOUNTER — TELEPHONE (OUTPATIENT)
Dept: BEHAVIORAL/MENTAL HEALTH CLINIC | Facility: CLINIC | Age: 41
End: 2021-05-27

## 2021-05-27 NOTE — TELEPHONE ENCOUNTER
Call placed to the patient who did not show up for appointment, no answer and voice mailbox was full and no message could be left,

## 2021-06-06 DIAGNOSIS — M54.9 MID BACK PAIN: ICD-10-CM

## 2021-06-07 RX ORDER — IBUPROFEN 800 MG/1
TABLET ORAL
Qty: 30 TABLET | Refills: 0 | Status: SHIPPED | OUTPATIENT
Start: 2021-06-07 | End: 2021-06-12 | Stop reason: HOSPADM

## 2021-06-11 ENCOUNTER — HOSPITAL ENCOUNTER (OUTPATIENT)
Facility: HOSPITAL | Age: 41
Setting detail: OBSERVATION
Discharge: HOME | End: 2021-06-12
Attending: EMERGENCY MEDICINE | Admitting: INTERNAL MEDICINE
Payer: COMMERCIAL

## 2021-06-11 ENCOUNTER — APPOINTMENT (OUTPATIENT)
Dept: MRI IMAGING | Facility: HOSPITAL | Age: 41
End: 2021-06-11
Payer: COMMERCIAL

## 2021-06-11 ENCOUNTER — APPOINTMENT (EMERGENCY)
Dept: CT IMAGING | Facility: HOSPITAL | Age: 41
End: 2021-06-11
Payer: COMMERCIAL

## 2021-06-11 ENCOUNTER — APPOINTMENT (EMERGENCY)
Dept: RADIOLOGY | Facility: HOSPITAL | Age: 41
End: 2021-06-11
Payer: COMMERCIAL

## 2021-06-11 DIAGNOSIS — R20.2 NUMBNESS AND TINGLING: Primary | ICD-10-CM

## 2021-06-11 DIAGNOSIS — R20.0 NUMBNESS AND TINGLING: Primary | ICD-10-CM

## 2021-06-11 DIAGNOSIS — R42 DIZZINESS: ICD-10-CM

## 2021-06-11 DIAGNOSIS — R20.0 LEFT ARM NUMBNESS: ICD-10-CM

## 2021-06-11 PROBLEM — D64.9 ANEMIA: Status: ACTIVE | Noted: 2021-06-11

## 2021-06-11 LAB
ALBUMIN SERPL BCP-MCNC: 3.9 G/DL (ref 3.5–5)
ALP SERPL-CCNC: 74 U/L (ref 46–116)
ALT SERPL W P-5'-P-CCNC: 27 U/L (ref 12–78)
AMPHETAMINES SERPL QL SCN: NEGATIVE
ANION GAP SERPL CALCULATED.3IONS-SCNC: 10 MMOL/L (ref 4–13)
AST SERPL W P-5'-P-CCNC: 19 U/L (ref 5–45)
ATRIAL RATE: 84 BPM
BARBITURATES UR QL: NEGATIVE
BASOPHILS # BLD AUTO: 0.08 THOUSANDS/ΜL (ref 0–0.1)
BASOPHILS NFR BLD AUTO: 1 % (ref 0–1)
BENZODIAZ UR QL: POSITIVE
BILIRUB SERPL-MCNC: 0.3 MG/DL (ref 0.2–1)
BILIRUB UR QL STRIP: NEGATIVE
BUN SERPL-MCNC: 8 MG/DL (ref 5–25)
CALCIUM SERPL-MCNC: 8.8 MG/DL (ref 8.3–10.1)
CHLORIDE SERPL-SCNC: 106 MMOL/L (ref 100–108)
CLARITY UR: CLEAR
CO2 SERPL-SCNC: 26 MMOL/L (ref 21–32)
COCAINE UR QL: NEGATIVE
COLOR UR: YELLOW
CREAT SERPL-MCNC: 0.72 MG/DL (ref 0.6–1.3)
EOSINOPHIL # BLD AUTO: 0.11 THOUSAND/ΜL (ref 0–0.61)
EOSINOPHIL NFR BLD AUTO: 1 % (ref 0–6)
ERYTHROCYTE [DISTWIDTH] IN BLOOD BY AUTOMATED COUNT: 20.5 % (ref 11.6–15.1)
EXT PREG TEST URINE: NEGATIVE
EXT. CONTROL ED NAV: NORMAL
GFR SERPL CREATININE-BSD FRML MDRD: 104 ML/MIN/1.73SQ M
GLUCOSE SERPL-MCNC: 81 MG/DL (ref 65–140)
GLUCOSE UR STRIP-MCNC: NEGATIVE MG/DL
HCT VFR BLD AUTO: 32.8 % (ref 34.8–46.1)
HGB BLD-MCNC: 9.5 G/DL (ref 11.5–15.4)
HGB UR QL STRIP.AUTO: NEGATIVE
IMM GRANULOCYTES # BLD AUTO: 0.03 THOUSAND/UL (ref 0–0.2)
IMM GRANULOCYTES NFR BLD AUTO: 0 % (ref 0–2)
KETONES UR STRIP-MCNC: NEGATIVE MG/DL
LEUKOCYTE ESTERASE UR QL STRIP: NEGATIVE
LIPASE SERPL-CCNC: 147 U/L (ref 73–393)
LYMPHOCYTES # BLD AUTO: 1.45 THOUSANDS/ΜL (ref 0.6–4.47)
LYMPHOCYTES NFR BLD AUTO: 17 % (ref 14–44)
MAGNESIUM SERPL-MCNC: 2.1 MG/DL (ref 1.6–2.6)
MCH RBC QN AUTO: 22.6 PG (ref 26.8–34.3)
MCHC RBC AUTO-ENTMCNC: 29 G/DL (ref 31.4–37.4)
MCV RBC AUTO: 78 FL (ref 82–98)
METHADONE UR QL: NEGATIVE
MONOCYTES # BLD AUTO: 0.66 THOUSAND/ΜL (ref 0.17–1.22)
MONOCYTES NFR BLD AUTO: 8 % (ref 4–12)
NEUTROPHILS # BLD AUTO: 6.11 THOUSANDS/ΜL (ref 1.85–7.62)
NEUTS SEG NFR BLD AUTO: 73 % (ref 43–75)
NITRITE UR QL STRIP: NEGATIVE
NRBC BLD AUTO-RTO: 0 /100 WBCS
OPIATES UR QL SCN: NEGATIVE
OXYCODONE+OXYMORPHONE UR QL SCN: NEGATIVE
P AXIS: 78 DEGREES
PCP UR QL: NEGATIVE
PH UR STRIP.AUTO: 6 [PH]
PLATELET # BLD AUTO: 429 THOUSANDS/UL (ref 149–390)
PMV BLD AUTO: 9.2 FL (ref 8.9–12.7)
POTASSIUM SERPL-SCNC: 4 MMOL/L (ref 3.5–5.3)
PR INTERVAL: 138 MS
PROT SERPL-MCNC: 7.7 G/DL (ref 6.4–8.2)
PROT UR STRIP-MCNC: NEGATIVE MG/DL
QRS AXIS: 80 DEGREES
QRSD INTERVAL: 78 MS
QT INTERVAL: 388 MS
QTC INTERVAL: 458 MS
RBC # BLD AUTO: 4.21 MILLION/UL (ref 3.81–5.12)
SODIUM SERPL-SCNC: 142 MMOL/L (ref 136–145)
SP GR UR STRIP.AUTO: 1.01 (ref 1–1.03)
T WAVE AXIS: 75 DEGREES
THC UR QL: NEGATIVE
TROPONIN I SERPL-MCNC: <0.02 NG/ML
UROBILINOGEN UR QL STRIP.AUTO: 0.2 E.U./DL
VENTRICULAR RATE: 84 BPM
WBC # BLD AUTO: 8.44 THOUSAND/UL (ref 4.31–10.16)

## 2021-06-11 PROCEDURE — 71045 X-RAY EXAM CHEST 1 VIEW: CPT

## 2021-06-11 PROCEDURE — 96374 THER/PROPH/DIAG INJ IV PUSH: CPT

## 2021-06-11 PROCEDURE — 83735 ASSAY OF MAGNESIUM: CPT | Performed by: INTERNAL MEDICINE

## 2021-06-11 PROCEDURE — 80053 COMPREHEN METABOLIC PANEL: CPT | Performed by: INTERNAL MEDICINE

## 2021-06-11 PROCEDURE — 99205 OFFICE O/P NEW HI 60 MIN: CPT | Performed by: PSYCHIATRY & NEUROLOGY

## 2021-06-11 PROCEDURE — 99220 PR INITIAL OBSERVATION CARE/DAY 70 MINUTES: CPT | Performed by: INTERNAL MEDICINE

## 2021-06-11 PROCEDURE — 70551 MRI BRAIN STEM W/O DYE: CPT

## 2021-06-11 PROCEDURE — 70496 CT ANGIOGRAPHY HEAD: CPT

## 2021-06-11 PROCEDURE — G1004 CDSM NDSC: HCPCS

## 2021-06-11 PROCEDURE — 85025 COMPLETE CBC W/AUTO DIFF WBC: CPT | Performed by: INTERNAL MEDICINE

## 2021-06-11 PROCEDURE — 80307 DRUG TEST PRSMV CHEM ANLYZR: CPT | Performed by: INTERNAL MEDICINE

## 2021-06-11 PROCEDURE — 70498 CT ANGIOGRAPHY NECK: CPT

## 2021-06-11 PROCEDURE — 83690 ASSAY OF LIPASE: CPT | Performed by: INTERNAL MEDICINE

## 2021-06-11 PROCEDURE — 81025 URINE PREGNANCY TEST: CPT | Performed by: INTERNAL MEDICINE

## 2021-06-11 PROCEDURE — 84484 ASSAY OF TROPONIN QUANT: CPT | Performed by: INTERNAL MEDICINE

## 2021-06-11 PROCEDURE — 93010 ELECTROCARDIOGRAM REPORT: CPT | Performed by: INTERNAL MEDICINE

## 2021-06-11 PROCEDURE — 36415 COLL VENOUS BLD VENIPUNCTURE: CPT | Performed by: INTERNAL MEDICINE

## 2021-06-11 PROCEDURE — 93005 ELECTROCARDIOGRAM TRACING: CPT

## 2021-06-11 PROCEDURE — 99285 EMERGENCY DEPT VISIT HI MDM: CPT | Performed by: EMERGENCY MEDICINE

## 2021-06-11 PROCEDURE — 99285 EMERGENCY DEPT VISIT HI MDM: CPT

## 2021-06-11 RX ORDER — METOCLOPRAMIDE HYDROCHLORIDE 5 MG/ML
10 INJECTION INTRAMUSCULAR; INTRAVENOUS ONCE
Status: COMPLETED | OUTPATIENT
Start: 2021-06-11 | End: 2021-06-11

## 2021-06-11 RX ORDER — ONDANSETRON 2 MG/ML
4 INJECTION INTRAMUSCULAR; INTRAVENOUS ONCE AS NEEDED
Status: COMPLETED | OUTPATIENT
Start: 2021-06-11 | End: 2021-06-11

## 2021-06-11 RX ORDER — TRAZODONE HYDROCHLORIDE 50 MG/1
50 TABLET ORAL
COMMUNITY

## 2021-06-11 RX ORDER — ALPRAZOLAM 0.5 MG/1
1 TABLET ORAL 2 TIMES DAILY PRN
Status: DISCONTINUED | OUTPATIENT
Start: 2021-06-11 | End: 2021-06-12 | Stop reason: HOSPADM

## 2021-06-11 RX ORDER — TRAZODONE HYDROCHLORIDE 50 MG/1
50 TABLET ORAL
Status: DISCONTINUED | OUTPATIENT
Start: 2021-06-11 | End: 2021-06-12 | Stop reason: HOSPADM

## 2021-06-11 RX ORDER — PAROXETINE HYDROCHLORIDE 20 MG/1
10 TABLET, FILM COATED ORAL
Status: DISCONTINUED | OUTPATIENT
Start: 2021-06-11 | End: 2021-06-12 | Stop reason: HOSPADM

## 2021-06-11 RX ORDER — ONDANSETRON 2 MG/ML
4 INJECTION INTRAMUSCULAR; INTRAVENOUS EVERY 6 HOURS PRN
Status: DISCONTINUED | OUTPATIENT
Start: 2021-06-11 | End: 2021-06-12 | Stop reason: HOSPADM

## 2021-06-11 RX ORDER — KETOROLAC TROMETHAMINE 30 MG/ML
30 INJECTION, SOLUTION INTRAMUSCULAR; INTRAVENOUS ONCE
Status: COMPLETED | OUTPATIENT
Start: 2021-06-11 | End: 2021-06-11

## 2021-06-11 RX ORDER — ACETAMINOPHEN 325 MG/1
650 TABLET ORAL EVERY 4 HOURS PRN
Status: DISCONTINUED | OUTPATIENT
Start: 2021-06-11 | End: 2021-06-12 | Stop reason: HOSPADM

## 2021-06-11 RX ORDER — MECLIZINE HYDROCHLORIDE 25 MG/1
50 TABLET ORAL ONCE
Status: COMPLETED | OUTPATIENT
Start: 2021-06-11 | End: 2021-06-11

## 2021-06-11 RX ORDER — DIPHENHYDRAMINE HYDROCHLORIDE 50 MG/ML
25 INJECTION INTRAMUSCULAR; INTRAVENOUS ONCE
Status: COMPLETED | OUTPATIENT
Start: 2021-06-11 | End: 2021-06-11

## 2021-06-11 RX ORDER — NICOTINE 21 MG/24HR
1 PATCH, TRANSDERMAL 24 HOURS TRANSDERMAL DAILY
Status: DISCONTINUED | OUTPATIENT
Start: 2021-06-11 | End: 2021-06-12 | Stop reason: HOSPADM

## 2021-06-11 RX ADMIN — METOCLOPRAMIDE 10 MG: 5 INJECTION, SOLUTION INTRAMUSCULAR; INTRAVENOUS at 16:03

## 2021-06-11 RX ADMIN — DIPHENHYDRAMINE HYDROCHLORIDE 25 MG: 50 INJECTION, SOLUTION INTRAMUSCULAR; INTRAVENOUS at 16:02

## 2021-06-11 RX ADMIN — IOHEXOL 80 ML: 350 INJECTION, SOLUTION INTRAVENOUS at 10:52

## 2021-06-11 RX ADMIN — PAROXETINE HYDROCHLORIDE 10 MG: 20 TABLET, FILM COATED ORAL at 21:36

## 2021-06-11 RX ADMIN — ONDANSETRON 4 MG: 2 INJECTION INTRAMUSCULAR; INTRAVENOUS at 10:25

## 2021-06-11 RX ADMIN — SODIUM CHLORIDE 1000 ML: 0.9 INJECTION, SOLUTION INTRAVENOUS at 11:47

## 2021-06-11 RX ADMIN — MECLIZINE HYDROCHLORIDE 50 MG: 25 TABLET ORAL at 10:26

## 2021-06-11 RX ADMIN — KETOROLAC TROMETHAMINE 30 MG: 30 INJECTION, SOLUTION INTRAMUSCULAR; INTRAVENOUS at 16:03

## 2021-06-11 RX ADMIN — ALPRAZOLAM 1 MG: 0.5 TABLET ORAL at 21:36

## 2021-06-11 RX ADMIN — NICOTINE 1 PATCH: 21 PATCH, EXTENDED RELEASE TRANSDERMAL at 15:49

## 2021-06-11 NOTE — ASSESSMENT & PLAN NOTE
Patient is undergoing divorce and suffers lots of stress  Started smoking recently 1 pack per day  Counseling patient smoking sensation  Nicotine patch

## 2021-06-11 NOTE — ED PROVIDER NOTES
History  Chief Complaint   Patient presents with    Numbness     pt reports awaking with tingling in left hand  pt reports increased numbness to left arm and left leg, dizziness, and nuasea  pt reports increased stress at home  pt reports large amount of alcohol consumption last night  25-year-old female past medical history significant for anxiety, PTSD, and depression who is currently undergoing a stressful situation who reports new onset numbness of the left upper extremity with cramping of the left leg  Reports to have woken up this morning at 6 30 am with these symptomatology  Initially started in the left hand numbness and tingling  and progressed to the left forearm and arm  Denies any pain or swelling of the involved area  Reports lower extremity symptom is "more like cramping"  She endorses nausea, blurry vision in both eyes, dizziness, and generalized headache which "she does not describe as the worst headache of her life"  She reports to have vomited about 6 -8 times this morning  She denies any trauma, dysphagia, leg weakness or numbness, voice hoarseness, dysphonia, photophobia, phonophobia, facial droop and changes in bowel and urinary habits  No prior history of similar episode, however she reports to have chronic vertigo that is usually aggravated by head movement  Reports to smoke 1PPD and drinks alcohol socially  Does not use recreational drugs  Takes 3 medications -  trazodone, alprazolam  Uncertain about the other medication she takes  Prior to Admission Medications   Prescriptions Last Dose Informant Patient Reported? Taking?    ALPRAZolam (XANAX) 1 mg tablet   No Yes   Sig: Take 1 tablet (1 mg total) by mouth 2 (two) times a day as needed for anxiety   PARoxetine (PAXIL) 10 mg tablet   No Yes   Sig: Take 1 tablet (10 mg total) by mouth daily at bedtime   busPIRone (BUSPAR) 7 5 mg tablet Not Taking at Unknown time  No No   Sig: Take 1 tablet (7 5 mg total) by mouth 2 (two) times a day   Patient not taking: Reported on 9/29/2020   cyclobenzaprine (FLEXERIL) 5 mg tablet Not Taking at Unknown time  No No   Sig: Take 1 tablet (5 mg total) by mouth 3 (three) times a day as needed for muscle spasms   Patient not taking: Reported on 9/29/2020   ibuprofen (MOTRIN) 600 mg tablet   No Yes   Sig: Take 1 tablet (600 mg total) by mouth every 6 (six) hours as needed for moderate pain   ibuprofen (MOTRIN) 600 mg tablet Not Taking at Unknown time  No No   Sig: Take 1 tablet (600 mg total) by mouth every 6 (six) hours as needed for mild pain   Patient not taking: Reported on 9/29/2020   ibuprofen (MOTRIN) 800 mg tablet Not Taking at Unknown time  No No   Sig: take 1 tablet by mouth every 8 hours if needed for mild pain   Patient not taking: Reported on 6/11/2021      Facility-Administered Medications: None       Past Medical History:   Diagnosis Date    Anxiety     LAST ASSESSED: 8/30/16    Depression     Endometriosis     History of varicose veins     Sleep disturbances        Past Surgical History:   Procedure Laterality Date    HERNIA REPAIR      LAPAROSCOPIC INGUINAL HERNIA REPAIR      INITIAL INGUINAL HERNIA    LAPAROSCOPY      EXPLORATORY    TUBAL LIGATION         Family History   Problem Relation Age of Onset    Hepatitis Mother     Lupus Mother     Hyperlipidemia Father     Arthritis Family     Lung cancer Family         UNSPECIFIED LATERALITY, UNSPECIFIED PART OF LUNG     I have reviewed and agree with the history as documented      E-Cigarette/Vaping    E-Cigarette Use Never User      E-Cigarette/Vaping Substances     Social History     Tobacco Use    Smoking status: Current Every Day Smoker     Packs/day: 1 00     Types: Cigarettes    Smokeless tobacco: Never Used    Tobacco comment: CURRENT TOBACCO USE   Substance Use Topics    Alcohol use: No     Comment: ALCOHOL USE AS PER [employer]    Drug use: No        Review of Systems   Constitutional: Negative for activity change, chills and fever  HENT: Negative for congestion, facial swelling, postnasal drip, tinnitus, trouble swallowing and voice change  Respiratory: Negative for apnea, chest tightness and shortness of breath  Cardiovascular: Negative for chest pain and palpitations  Gastrointestinal: Negative  Genitourinary: Negative  Skin: Negative  Neurological: Positive for dizziness, numbness and headaches  Negative for tremors, facial asymmetry and speech difficulty  Physical Exam  ED Triage Vitals   Temperature Pulse Respirations Blood Pressure SpO2   06/11/21 0938 06/11/21 0929 06/11/21 0929 06/11/21 0929 06/11/21 0929   98 5 °F (36 9 °C) 79 16 117/60 100 %      Temp Source Heart Rate Source Patient Position - Orthostatic VS BP Location FiO2 (%)   06/11/21 0938 06/11/21 0929 06/11/21 0929 06/11/21 0929 --   Oral Monitor Lying Right arm       Pain Score       --                    Orthostatic Vital Signs  Vitals:    06/11/21 0929 06/11/21 1030 06/11/21 1130 06/11/21 1150   BP: 117/60 131/65 115/60 136/69   Pulse: 79 79 84 83   Patient Position - Orthostatic VS: Lying Lying Lying Sitting       Physical Exam  Constitutional:       General: She is not in acute distress  Appearance: She is not toxic-appearing  HENT:      Head: Normocephalic and atraumatic  Cardiovascular:      Rate and Rhythm: Normal rate and regular rhythm  Pulses: Normal pulses  Heart sounds: Normal heart sounds  No murmur  Pulmonary:      Effort: Pulmonary effort is normal  No respiratory distress  Breath sounds: No wheezing  Abdominal:      General: Bowel sounds are normal  There is no distension  Palpations: Abdomen is soft  Tenderness: There is no abdominal tenderness  Musculoskeletal:      Right lower leg: No edema  Left lower leg: No edema  Skin:     Coloration: Skin is not jaundiced or pale  Neurological:      Mental Status: She is alert and oriented to person, place, and time  Mental status is at baseline  GCS: GCS eye subscore is 4  GCS verbal subscore is 5  GCS motor subscore is 6  Cranial Nerves: No dysarthria or facial asymmetry  Sensory: Sensory deficit (Sensation deficits noted on the left forearm and hand) present  Motor: No weakness or tremor  Coordination: Coordination is intact  Comments: Noticeable poor  strength in the left hand -reports that is due to poor sensation in the said hand which was noted to have improved with encouragement  Psychiatric:         Speech: Speech normal          Behavior: Behavior normal  Behavior is cooperative  ED Medications  Medications   sodium chloride 0 9 % bolus 1,000 mL (1,000 mL Intravenous New Bag 6/11/21 1147)   ondansetron (ZOFRAN) injection 4 mg (4 mg Intravenous Given 6/11/21 1025)   meclizine (ANTIVERT) tablet 50 mg (50 mg Oral Given 6/11/21 1026)   iohexol (OMNIPAQUE) 350 MG/ML injection (SINGLE-DOSE) 80 mL (80 mL Intravenous Given 6/11/21 1052)       Diagnostic Studies  Results Reviewed     Procedure Component Value Units Date/Time    Rapid drug screen, urine [[id number]]  (Abnormal) Collected: 06/11/21 1120    Lab Status: Final result Specimen: Urine, Clean Catch Updated: 06/11/21 1153     Amph/Meth UR Negative     Barbiturate Ur Negative     Benzodiazepine Urine Positive     Cocaine Urine Negative     Methadone Urine Negative     Opiate Urine Negative     PCP Ur Negative     THC Urine Negative     Oxycodone Urine Negative    Narrative:      Presumptive report  If requested, specimen will be sent to reference lab for confirmation  FOR MEDICAL PURPOSES ONLY  IF CONFIRMATION NEEDED PLEASE CONTACT THE LAB WITHIN 5 DAYS      Drug Screen Cutoff Levels:  AMPHETAMINE/METHAMPHETAMINES  1000 ng/mL  BARBITURATES     200 ng/mL  BENZODIAZEPINES     200 ng/mL  COCAINE      300 ng/mL  METHADONE      300 ng/mL  OPIATES      300 ng/mL  PHENCYCLIDINE     25 ng/mL  THC       50 ng/mL  OXYCODONE      100 ng/mL    UA w Reflex to Microscopic w Reflex to Culture [[id number]] Collected: 06/11/21 1120    Lab Status: Final result Specimen: Urine, Clean Catch Updated: 06/11/21 1150     Color, UA Yellow     Clarity, UA Clear     Specific Gravity, UA 1 010     pH, UA 6 0     Leukocytes, UA Negative     Nitrite, UA Negative     Protein, UA Negative mg/dl      Glucose, UA Negative mg/dl      Ketones, UA Negative mg/dl      Urobilinogen, UA 0 2 E U /dl      Bilirubin, UA Negative     Blood, UA Negative    POCT pregnancy, urine [[id number]]  (Normal) Resulted: 06/11/21 1126    Lab Status: Final result Updated: 06/11/21 1126     EXT PREG TEST UR (Ref: Negative) NEGATIVE     Control VALID    Troponin I [[id number]]  (Normal) Collected: 06/11/21 1026    Lab Status: Final result Specimen: Blood from Arm, Right Updated: 06/11/21 1047     Troponin I <0 02 ng/mL     Comprehensive metabolic panel [[id number]] Collected: 06/11/21 1004    Lab Status: Final result Specimen: Blood from Arm, Right Updated: 06/11/21 1032     Sodium 142 mmol/L      Potassium 4 0 mmol/L      Chloride 106 mmol/L      CO2 26 mmol/L      ANION GAP 10 mmol/L      BUN 8 mg/dL      Creatinine 0 72 mg/dL      Glucose 81 mg/dL      Calcium 8 8 mg/dL      AST 19 U/L      ALT 27 U/L      Alkaline Phosphatase 74 U/L      Total Protein 7 7 g/dL      Albumin 3 9 g/dL      Total Bilirubin 0 30 mg/dL      eGFR 104 ml/min/1 73sq m     Narrative:      Nicola guidelines for Chronic Kidney Disease (CKD):     Stage 1 with normal or high GFR (GFR > 90 mL/min/1 73 square meters)    Stage 2 Mild CKD (GFR = 60-89 mL/min/1 73 square meters)    Stage 3A Moderate CKD (GFR = 45-59 mL/min/1 73 square meters)    Stage 3B Moderate CKD (GFR = 30-44 mL/min/1 73 square meters)    Stage 4 Severe CKD (GFR = 15-29 mL/min/1 73 square meters)    Stage 5 End Stage CKD (GFR <15 mL/min/1 73 square meters)  Note: GFR calculation is accurate only with a steady state creatinine    Lipase [[id number]]  (Normal) Collected: 06/11/21 1004    Lab Status: Final result Specimen: Blood from Arm, Right Updated: 06/11/21 1032     Lipase 147 u/L     Magnesium [[id number]]  (Normal) Collected: 06/11/21 1004    Lab Status: Final result Specimen: Blood from Arm, Right Updated: 06/11/21 1032     Magnesium 2 1 mg/dL     CBC and differential [[id number]]  (Abnormal) Collected: 06/11/21 1004    Lab Status: Final result Specimen: Blood from Arm, Right Updated: 06/11/21 1018     WBC 8 44 Thousand/uL      RBC 4 21 Million/uL      Hemoglobin 9 5 g/dL      Hematocrit 32 8 %      MCV 78 fL      MCH 22 6 pg      MCHC 29 0 g/dL      RDW 20 5 %      MPV 9 2 fL      Platelets 770 Thousands/uL      nRBC 0 /100 WBCs      Neutrophils Relative 73 %      Immat GRANS % 0 %      Lymphocytes Relative 17 %      Monocytes Relative 8 %      Eosinophils Relative 1 %      Basophils Relative 1 %      Neutrophils Absolute 6 11 Thousands/µL      Immature Grans Absolute 0 03 Thousand/uL      Lymphocytes Absolute 1 45 Thousands/µL      Monocytes Absolute 0 66 Thousand/µL      Eosinophils Absolute 0 11 Thousand/µL      Basophils Absolute 0 08 Thousands/µL                  CTA head and neck with and without contrast   Final Result by Nathanael Mercado DO (06/11 1145)      Normal CT of the brain  Normal CTA of the neck and brain              Workstation performed: [id number]         XR chest 1 view portable   ED Interpretation by Baldev Mcintyre DO (06/11 1114)   No acute abnormality in the chest             Procedures  ECG 12 Lead Documentation Only    Date/Time: 6/11/2021 10:45 AM  Performed by: Juan Robbins MD  Authorized by: Juan Robbins MD     ECG reviewed by me, the ED Provider: yes    Patient location:  ED  Previous ECG:     Previous ECG:  Compared to current    Similarity:  No change  Interpretation:     Interpretation: normal    Rate:     ECG rate assessment: normal    Rhythm:     Rhythm: sinus rhythm    Conduction:     Conduction: normal    ST segments:     ST segments:  Normal  T waves:     T waves: normal            ED Course                 Stroke Assessment     Row Name 06/11/21 1121             NIH Stroke Scale    Interval  Baseline      Level of Consciousness (1a )  0      LOC Questions (1b )  0      LOC Commands (1c )  0      Best Gaze (2 )  0      Visual (3 )  0      Facial Palsy (4 )  0      Motor Arm, Left (5a )  0      Motor Arm, Right (5b )  0      Motor Leg, Left (6a )  0      Motor Leg, Right (6b )  0      Limb Ataxia (7 )  0      Sensory (8 )  2      Best Language (9 )  0      Dysarthria (10 )  0      Extinction and Inattention (11 ) (Formerly Neglect)  0      Total  2          First Filed Value   TPA Decision  Patient not a TPA candidate  Patient is not a candidate options  Unclear time of onset outside appropriate time window , Symptoms resolved/clearly non disabling  Chest x-ray as reviewed by me showed no evidence of acute cardiopulmonary abnormality  CTA head and neck negative for any acute intracranial and vascular abnormality  Discussed limitation of CTA study with patient in the evaluation of posterior CVA  Agreeable to hospitalization for further studies  MDM  Number of Diagnoses or Management Options  Dizziness:   Numbness and tingling:   Diagnosis management comments: Will do a CBC, CMP, UA,  magnesium, troponin, CXR, UDS, and a CTA head and neck  Will rule out a CVA, although unlikely  Also out of tPA window so will hold  Will treat symptomatically with meclizine and zofran and follow up with labs and imaging studies  CTA head and neck -negative for any acute intracranial/vascular abnormality  Limitation of CT head study discussed with patient  She is agreeable for further studies  Discussed with SLIM  Admitted for observation  Neurology consulted and TT         Amount and/or Complexity of Data Reviewed  Clinical lab tests: ordered  Tests in the radiology section of CPT®: ordered  Tests in the medicine section of CPT®: ordered    Patient Progress  Patient progress: stable      Disposition  Final diagnoses:   Numbness and tingling   Dizziness     Time reflects when diagnosis was documented in both MDM as applicable and the Disposition within this note     Time User Action Codes Description Comment    6/11/2021 12:06 PM Ugwuegbulem, Juancarlos Add [R20 0,  R20 2] Numbness and tingling     6/11/2021 12:09 PM Ugwuegbulem, Juancarlos Add [R42] Dizziness       ED Disposition     ED Disposition Condition Date/Time Comment    Admit Stable Fri Jun 11, 2021 12:06 PM Case was discussed with Bridger Yang and the patient's admission status was agreed to be Admission Status: observation status to the service of Dr Bridger Yang  Follow-up Information    None         Patient's Medications   Discharge Prescriptions    No medications on file     No discharge procedures on file  PDMP Review       Value Time User    PDMP Reviewed  Yes 9/29/2020  8:36 AM Pacheco Noriega MD           ED Provider  Attending physically available and evaluated Seth Thomas I managed the patient along with the ED Attending      Electronically Signed by         Rosy Smith MD  06/11/21 3626

## 2021-06-11 NOTE — ASSESSMENT & PLAN NOTE
50-year-old female with anxiety/depression, history of vertigo, sleep disturbance, endometriosis, alcohol use, presents with left upper extremity numbness/paresthesias  As noted, left upper extremity paresthesias originated in her fingers and slowly marched up the left upper extremity over the course of approximately 5 minutes  This is in the setting of pulsatile headache, nausea, vomiting and photophobia, as well as positional vertigo  Strong suspicion of migrainous etiology  CT head on presentation demonstrated no acute changes  CTA head/neck demonstrated no flow consequential stenosis or LVO  Blood pressure on presentation 117/60  Plan:  -will give migraine cocktail in ED (Benadryl 25 mg, Reglan 10 mg, and Toradol 30 mg)  -MRI brain ordered  If negative for acute stroke, no further inpatient recommendations  -recommend ENT follow-up as outpatient if positional vertigo persists

## 2021-06-11 NOTE — ED ATTENDING ATTESTATION
6/11/2021  IHilda DO, saw and evaluated the patient  I have discussed the patient with the resident/non-physician practitioner and agree with the resident's/non-physician practitioner's findings, Plan of Care, and MDM as documented in the resident's/non-physician practitioner's note, except where noted  All available labs and Radiology studies were reviewed  I was present for key portions of any procedure(s) performed by the resident/non-physician practitioner and I was immediately available to provide assistance  At this point I agree with the current assessment done in the Emergency Department  I have conducted an independent evaluation of this patient a history and physical is as follows:    HPI:  Patient is a 48-year-old female with past medical history of anxiety, depression, presents to the emergency department for dizziness, nausea, vomiting and left arm numbness and tingling that started early this morning  Patient reports she was just getting up at around 6:30 a m  And when she was in the shower at that time she started feeling numbness and tingling in her left hand  She initially attributed it to sleeping funny however throughout the morning her numbness and tingling started to progress and involve her lower half of her left arm  She also started feeling dizzy described as her spinning which was followed by nausea and 6-7 episodes of nonbilious nonbloody vomiting  She states she has had vertigo before and her symptoms feel similar except for the left arm numbness and tingling which she has never had before  She states she has had bilateral hand tingling secondary to panic attacks before but usually occurs bilaterally  She denies any extremity weakness or lower extremity involvement however does report she feels as though she has been getting charley horses and muscle spasms in her left leg    She denies any difficulty with her speech or getting words out, facial drooping, confusion  Since the dizziness started she does feel off balance and was having a difficult time walking at home which was similar to when she has had vertigo in the past   She reports having a mild frontal headache last night for which she took ibuprofen and does report waking up with a mild headache but denies thunderclap onset or worst headache of her life  Denies any change in her vision or photophobia  Patient reports her dizziness is made worse by head turning left and right as well as neck extension as well as from closing her eyes  She admits to drinking alcohol last night but denies any recreational drug use  Denies any recent head or neck injury  She does admit to being under increased stress, more anxious and depressed than normal as she is going through a divorce after finding out her  was cheating on her  On review of systems, she does report some suprapubic pressure and pressure when she urinates but does not feel as though she is fully voiding  Denies burning sensation with urination, increased frequency or hematuria  She denies any new fever, chills, lightheadedness or near syncope, tinnitus, loss of hearing, earache, cough or URI symptoms, neck stiffness, chest pain, palpitations, dyspnea, abdominal pain, diarrhea, constipation, blood per rectum or melena, skin rash or color change, extremity swelling  Physical Exam:   General:  Patient is awake and alert  Patient appears to be in no apparent distress  Patient nontoxic and well-appearing  Vital signs reviewed and within normal limits  HEENT:  Normocephalic/atraumatic  Pupils equal round and reactive to light  Extraocular muscles intact  Oropharynx clear  Nose normal   External ears normal  No appreciable nystagmus  Neck:  Supple, without signs of meningismus  Full range of motion  Nontender  No JVD  No cervical lymphadenopathy  Cardiovascular:  Normal S1, S2   Regular rate and rhythm    No murmurs, rubs or gallops  Pulmonary:  Lungs clear to auscultation bilaterally  No wheezing, rales or rhonchi  Abdomen:  Abdomen soft, nondistended and nontender  No rebound or guarding  : Deferred  MSK / Extremities:  No extremity tenderness, swelling or signs of injury  Full range of motion of all extremities  Skin:  Skin dry and warm  No obvious rashes  No cyanosis  Neuro:  A&O x4  No gross motor deficits  5/5 strength throughout x 4 extremities  Decreased sensation from left elbow down to left hand  Proximal LUE sensation normal  All other extremities exhibit normal sensation  Normal speech  Normal finger-to-nose and heel-to-shin exam    Psych: Appears depressed  Assessment & Plan:  40-year-old female presents with acute onset vertigo described as spinning sensation dizziness associated with nausea and vomiting as well as left arm numbness and tingling  In regards to her neurologic exam, the only deficit is subjective decreased sensation of the left arm which is from the elbow distally and does not include the proximal left upper extremity  No extremity weakness  Normal cerebellar exam   Overall low suspicion for stroke but given unilateral symptoms will obtain CTA head and neck  Vertigo is likely benign in origin  Will treat symptomatically with meclizine, Zofran and IV fluids  Will give Valium if meclizine is not helping  If patient still is having the left arm tingling and numbness with normal head CT, will likely recommend admission for MRI to definitively rule out stroke  Patient not TPA candidate (outisde 3 5 hr window by time of ED evaluation, as well as very low NIHSS/nondebilitating symptoms)  ED Course  ED Course as of Jun 11 1519   Fri Jun 11, 2021   1152 Patient reassessed and reports her nausea has improved however she is still feeling dizzy  Patient also still complains of the left arm tingling sensation    At this time we recommended admission for Neurology consultation and MRI and patient is agreeable              Critical Care Time  ECG 12 Lead Documentation Only    Date/Time: 6/11/2021 11:20 AM  Performed by: Ingrid Ponce DO  Authorized by: Ingrid Ponce DO     ECG reviewed by me, the ED Provider: yes    Patient location:  ED  Previous ECG:     Previous ECG:  Compared to current    Similarity:  No change  Interpretation:     Interpretation: normal    Rate:     ECG rate:  84    ECG rate assessment: normal    Rhythm:     Rhythm: sinus rhythm    Ectopy:     Ectopy: none    QRS:     QRS axis:  Normal    QRS intervals:  Normal  Conduction:     Conduction: normal    ST segments:     ST segments:  Normal  T waves:     T waves: normal

## 2021-06-11 NOTE — LETTER
[address] [facility]  [address]  [facility] [place] [facility] [address]  Dept: [phone]    June 12, 2021     Patient: Ruba Wolfe   YOB: 1980   Date of Visit: 6/11/2021       To Whom it May Concern:    Juvencio Dan is under my professional care  She was seen in the hospital from 6/11/2021   to 06/12/21  She may return to work on 06/18/2021    If you have any questions or concerns, please don't hesitate to call           Sincerely,          Jeff Godfrey MD

## 2021-06-11 NOTE — ASSESSMENT & PLAN NOTE
Patient presents with left arm numbness and tingling started 6:30 a m  Associated with dizziness, headache, vision changes and nausea and vomiting  Nausea and vomiting has improved, however, left arm numbness persists  Patient denies fever, chills, weakness in extremities  No chest pain, abdominal pain, dysuria or bowel movement changes  On exam, patient is awake and alert  Sensory deficit in left arm and hands  No muscle weakness  Nystagmus negative  Finger-to-nose and heel to chin negative  EKG revealed normal sinus rhythm  CT brain and CTA negative  Inpatient consult to neurology  Will admit patient for observation  Stroke pathway  Will check HbA1c, lipid panel, iron panel  Echo  MRI brain    Frequent neuro checks  Continue monitor on tele

## 2021-06-11 NOTE — ASSESSMENT & PLAN NOTE
Patient presented with hemoglobin 9 5  MCV 78  Patient stated she has heavy periods    Patient denies melena  Will check iron panel  Closely monitor CBC

## 2021-06-11 NOTE — CONSULTS
Consultation - Neurology   Machelle Suarez 42 y o  female MRN: [id number]  Unit/Bed#: ED 19 Encounter: [id number]      Assessment/Plan     * Left arm numbness  Assessment & Plan  50-year-old female with anxiety/depression, history of vertigo, sleep disturbance, endometriosis, alcohol use, presents with left upper extremity numbness/paresthesias  As noted, left upper extremity paresthesias originated in her fingers and slowly marched up the left upper extremity over the course of approximately 5 minutes  This is in the setting of pulsatile headache, nausea, vomiting and photophobia, as well as positional vertigo  Strong suspicion of migrainous etiology  CT head on presentation demonstrated no acute changes  CTA head/neck demonstrated no flow consequential stenosis or LVO  Blood pressure on presentation 117/60  Plan:  -will give migraine cocktail in ED (Benadryl 25 mg, Reglan 10 mg, and Toradol 30 mg)  -MRI brain ordered  If negative for acute stroke, no further inpatient recommendations  -recommend ENT follow-up as outpatient if positional vertigo persists  Current every day smoker  Assessment & Plan  Cessation counseling  Anxiety  Assessment & Plan  On Paxil and Xanax at baseline  Reports increased stress recently  Recommendations for outpatient neurological follow up have yet to be determined  History of Present Illness     Reason for Consult / Principal Problem:  Left upper extremity numbness/paresthesia  Hx and PE limited by:  N/A  HPI: Machelle Suarez is a 42 y o  right handed female with anxiety/depression, history of vertigo, sleep disturbance, endometriosis, alcohol use, presents with left upper extremity numbness/paresthesias  Patient states she drank 3 shots of liquor last evening  She started to have a throbbing bifrontal headache last evening, which was still present upon awakening this morning    This morning before taking a shower, she noticed tingling in all the fingertips on her left hand, which marched up her entire left upper extremity over the course of about 5 minutes, and then became numb  She took a shower and then started vomiting and noticed a Charley horse cramping in her posterior left lower extremity  She denied lateralized weakness except for some mild left  strength difficulty  She denied diplopia but does have blurred vision and floaters in both of her eyes today  No dysarthria, word-finding difficulty, facial droop, or facial involvement of her sensory symptoms  She denies prior diagnosis of migraines but does occasionally get throbbing headaches  She says she is sensitive to light at all times  Her grandmother suffered with migraines  CT head on presentation demonstrated no acute changes  CTA head/neck demonstrated no flow consequential stenosis or LVO  Blood pressure on presentation 117/60  Neurologic exam significant for sensory deficit to left upper extremity and some mild left  strength weakness  Consult to neurology  Consult performed by: David Mckeon PA-C  Consult ordered by: Ibeth Gonzalez MD          Review of Systems   Constitutional: Negative  HENT: Negative  Eyes: Positive for photophobia  Respiratory: Negative  Cardiovascular: Negative  Gastrointestinal: Positive for nausea and vomiting  Endocrine: Negative  Genitourinary: Negative  Musculoskeletal: Negative  Skin: Negative for rash  Allergic/Immunologic: Negative  Neurological: Positive for dizziness, numbness and headaches  As above  Hematological: Negative  Psychiatric/Behavioral: Negative          Historical Information   Past Medical History:   Diagnosis Date    Anxiety     LAST ASSESSED: 8/30/16    Depression     Endometriosis     History of varicose veins     Sleep disturbances      Past Surgical History:   Procedure Laterality Date    HERNIA REPAIR      LAPAROSCOPIC INGUINAL HERNIA REPAIR      INITIAL INGUINAL HERNIA    LAPAROSCOPY      EXPLORATORY    TUBAL LIGATION       Social History   Social History     Substance and Sexual Activity   Alcohol Use No    Comment: ALCOHOL USE AS PER ALLSCRIPTS     Social History     Substance and Sexual Activity   Drug Use No     E-Cigarette/Vaping    E-Cigarette Use Never User      E-Cigarette/Vaping Substances     Social History     Tobacco Use   Smoking Status Current Every Day Smoker    Packs/day: 1 00    Types: Cigarettes   Smokeless Tobacco Never Used   Tobacco Comment    CURRENT TOBACCO USE     Family History:   Family History   Problem Relation Age of Onset    Hepatitis Mother     Lupus Mother     Hyperlipidemia Father     Arthritis Family     Lung cancer Family         UNSPECIFIED LATERALITY, UNSPECIFIED PART OF LUNG       Review of previous medical records was completed  Meds/Allergies   PTA meds:   Prior to Admission Medications   Prescriptions Last Dose Informant Patient Reported? Taking?    ALPRAZolam (XANAX) 1 mg tablet 6/10/2021 at Unknown time  No Yes   Sig: Take 1 tablet (1 mg total) by mouth 2 (two) times a day as needed for anxiety   PARoxetine (PAXIL) 10 mg tablet 6/10/2021 at Unknown time  No Yes   Sig: Take 1 tablet (10 mg total) by mouth daily at bedtime   busPIRone (BUSPAR) 7 5 mg tablet Not Taking at Unknown time  No No   Sig: Take 1 tablet (7 5 mg total) by mouth 2 (two) times a day   Patient not taking: Reported on 9/29/2020   cyclobenzaprine (FLEXERIL) 5 mg tablet Not Taking at Unknown time  No No   Sig: Take 1 tablet (5 mg total) by mouth 3 (three) times a day as needed for muscle spasms   Patient not taking: Reported on 9/29/2020   ibuprofen (MOTRIN) 600 mg tablet   No Yes   Sig: Take 1 tablet (600 mg total) by mouth every 6 (six) hours as needed for moderate pain   ibuprofen (MOTRIN) 600 mg tablet Not Taking at Unknown time  No No   Sig: Take 1 tablet (600 mg total) by mouth every 6 (six) hours as needed for mild pain   Patient not taking: Reported on 9/29/2020   ibuprofen (MOTRIN) 800 mg tablet Not Taking at Unknown time  No No   Sig: take 1 tablet by mouth every 8 hours if needed for mild pain   Patient not taking: Reported on 6/11/2021   traZODone (DESYREL) 50 mg tablet 6/10/2021 at Unknown time  Yes Yes   Sig: Take 50 mg by mouth daily at bedtime as needed for sleep      Facility-Administered Medications: None       Allergies   Allergen Reactions    Bee Venom Hives    Lidocaine Hives    Penicillins Hives       Objective   Vitals:Blood pressure 117/57, pulse 102, temperature 98 5 °F (36 9 °C), temperature source Oral, resp  rate 19, height 5' 5" (1 651 m), weight 72 6 kg (160 lb), SpO2 98 %  ,Body mass index is 26 63 kg/m²  Intake/Output Summary (Last 24 hours) at 6/11/2021 1606  Last data filed at 6/11/2021 1347  Gross per 24 hour   Intake 1000 ml   Output --   Net 1000 ml       Invasive Devices: Invasive Devices     Peripheral Intravenous Line            Peripheral IV 06/11/21 Right Antecubital less than 1 day    Peripheral IV 06/11/21 Right Hand less than 1 day                Physical Exam   General:  Patient is well-developed, well-nourished, and in no acute distress  HEENT:  Head normocephalic  Eyes anicteric  Cardiovascular:  With regular rhythm  Lungs:  Normal effort  Nonlabored breathing  Extremities:  With no significant edema  Skin: No rashes  Neurologic Exam  Mental Status:  Patient is alert, pleasantly interactive, and appropriately conversational   No obvious symbolic language difficulty or dysarthria, and the patient is fully oriented  Gait deferred for safety  Cranial Nerves:   II: Visual fields full to confrontation  Pupils equal, round, reactive to light with normal accomodation  Cannot visualize optic fundi  III,IV,VI: extraocular movements intact with no nystagmus  V: Sensation in the V1 through V3 distributions intact to light touch and temperature bilaterally     VII: Face is symmetric with no weakness noted  VIII: Audition intact to finger rub bilaterally  IX/X: Uvula midline  Soft palate elevation symmetric  XII: Tongue midline with no atrophy or fasciculations with appropriate movement  Coordination:  Accurate with finger-to-nose and heel-to-shin maneuvers bilaterally  Motor testing with no upper or lower extremity drift  Full strength right upper and bilateral lower extremities  Left  strength 4+5 otherwise full strength remainder of the left extremity  Sensory testing grossly intact to temperature and vibration throughout the right upper extremity and bilateral lower extremities  Patient states she is unable to appreciate touch or temperature in the left upper extremity  Able to appreciate vibration left upper extremity    Tinel's test positive on the left  Toe responses are downgoing bilaterally  Lab Results:   CBC:   Results from last 7 days   Lab Units 06/11/21  1004   WBC Thousand/uL 8 44   RBC Million/uL 4 21   HEMOGLOBIN g/dL 9 5*   HEMATOCRIT % 32 8*   MCV fL 78*   PLATELETS Thousands/uL 429*   , BMP/CMP:   Results from last 7 days   Lab Units 06/11/21  1004   SODIUM mmol/L 142   POTASSIUM mmol/L 4 0   CHLORIDE mmol/L 106   CO2 mmol/L 26   BUN mg/dL 8   CREATININE mg/dL 0 72   CALCIUM mg/dL 8 8   AST U/L 19   ALT U/L 27   ALK PHOS U/L 74   EGFR ml/min/1 73sq m 104   , Drug Screen:   Results from last 7 days   Lab Units 06/11/21  1120   BARBITURATE UR  Negative   BENZODIAZEPINE UR  Positive*   THC UR  Negative   COCAINE UR  Negative   METHADONE URINE  Negative   OPIATE UR  Negative   PCP UR  Negative     Imaging Studies: I have personally reviewed pertinent reports  and I have personally reviewed pertinent films in PACS [facility], CTA h/n  EKG, Pathology, and Other Studies: I have personally reviewed pertinent reports      VTE Prophylaxis: Sequential compression device Abdullahi Mcdermott)     Code Status: Level 1 - Full Code  Advance Directive and Living Will:      Power of :    POLST:

## 2021-06-11 NOTE — H&P
[address] [facility] 1980, 42 y o  female MRN: [id number]  Unit/Bed#: ED 19 Encounter: [id number]  Primary Care Provider: Ata Sibley MD   Date and time admitted to hospital: 6/11/2021  9:26 AM    * Left arm numbness  Assessment & Plan  Patient presents with left arm numbness and tingling started 6:30 a m  Associated with dizziness, headache, vision changes and nausea and vomiting  Nausea and vomiting has improved, however, left arm numbness persists  Patient denies fever, chills, weakness in extremities  No chest pain, abdominal pain, dysuria or bowel movement changes  On exam, patient is awake and alert  Sensory deficit in left arm and hands  No muscle weakness  Nystagmus negative  Finger-to-nose and heel to chin negative  EKG revealed normal sinus rhythm  CT brain and CTA negative  Inpatient consult to neurology  Will admit patient for observation  Stroke pathway  Will check HbA1c, lipid panel, iron panel  Echo  MRI brain  Frequent neuro checks  Continue monitor on tele    Anemia  Assessment & Plan  Patient presented with hemoglobin 9 5  MCV 78  Patient stated she has heavy periods  Patient denies melena  Will check iron panel  Closely monitor CBC    Current every day smoker  Assessment & Plan  Patient is undergoing divorce and suffers lots of stress  Started smoking recently 1 pack per day  Counseling patient smoking sensation  Nicotine patch    Anxiety  Assessment & Plan  Xanax as needed    VTE Prophylaxis: Enoxaparin (Lovenox)  / sequential compression device   Code Status:  Level 1  POLST: There is no POLST form on file for this patient (pre-hospital)  Discussion with family:  Patient    Anticipated Length of Stay:  Patient will be admitted on an Observation basis with an anticipated length of stay of  1 midnights     Justification for Hospital Stay:  Rule out stroke      Chief Complaint:   Left arm numbness    History of Present Illness:    Kaye Kelley is a 42 y o  female with a past medical history of anxiety and PTSD who presents with left arm numbness  Patient stated that left arm numbness and tingling started 6:30 a m  Associated with dizziness, headache, vision changes and nausea and vomiting  Patient vomited 6-7 times in the morning  Nausea and vomiting has improved, however, left arm numbness persists in the ED  Patient had a history of vertigo in the past  Patient denies fever, chills, weakness in extremities  No chest pain, abdominal pain, dysuria or bowel movement changes  Patient denies history of diabetes, hypertension  Patient is undergoing Devoice and suffers lot pressure  Patient started to smoke recently  At ED, CT brain and CTA negative  Review of Systems:    Review of Systems   Constitutional: Negative for chills and fever  HENT: Negative for congestion, rhinorrhea, sneezing and sore throat  Eyes: Negative for pain and discharge  Respiratory: Negative for cough, chest tightness, shortness of breath and wheezing  Cardiovascular: Negative for chest pain and leg swelling  Gastrointestinal: Positive for nausea and vomiting  Negative for abdominal pain  Endocrine: Negative for polydipsia, polyphagia and polyuria  Genitourinary: Negative for flank pain, frequency and urgency  Musculoskeletal: Negative for arthralgias, back pain and joint swelling  Skin: Negative for color change and pallor  Neurological: Positive for dizziness and numbness  Negative for weakness, light-headedness and headaches  Psychiatric/Behavioral: Negative for agitation and confusion         Past Medical and Surgical History:     Past Medical History:   Diagnosis Date    Anxiety     LAST ASSESSED: 8/30/16    Depression     Endometriosis     History of varicose veins     Sleep disturbances        Past Surgical History:   Procedure Laterality Date    HERNIA REPAIR      LAPAROSCOPIC INGUINAL HERNIA REPAIR      INITIAL INGUINAL HERNIA    LAPAROSCOPY EXPLORATORY    TUBAL LIGATION         Meds/Allergies:    Prior to Admission medications    Medication Sig Start Date End Date Taking? Authorizing Provider   ALPRAZolam Dorthey Halim) 1 mg tablet Take 1 tablet (1 mg total) by mouth 2 (two) times a day as needed for anxiety 4/28/21 6/11/21 Yes Kasey Cevallos MD   ibuprofen (MOTRIN) 600 mg tablet Take 1 tablet (600 mg total) by mouth every 6 (six) hours as needed for moderate pain 1/2/20  Yes Celestine House MD   PARoxetine (PAXIL) 10 mg tablet Take 1 tablet (10 mg total) by mouth daily at bedtime 4/28/21  Yes Kasey Cevallos MD   traZODone (DESYREL) 50 mg tablet Take 50 mg by mouth daily at bedtime as needed for sleep   Yes Historical Provider, MD   busPIRone (BUSPAR) 7 5 mg tablet Take 1 tablet (7 5 mg total) by mouth 2 (two) times a day  Patient not taking: Reported on 9/29/2020 1/2/20   Celestine House MD   cyclobenzaprine (FLEXERIL) 5 mg tablet Take 1 tablet (5 mg total) by mouth 3 (three) times a day as needed for muscle spasms  Patient not taking: Reported on 9/29/2020 1/2/20   Celestine House MD   ibuprofen (MOTRIN) 600 mg tablet Take 1 tablet (600 mg total) by mouth every 6 (six) hours as needed for mild pain  Patient not taking: Reported on 9/29/2020 1/2/20   Celestine House MD   ibuprofen (MOTRIN) 800 mg tablet take 1 tablet by mouth every 8 hours if needed for mild pain  Patient not taking: Reported on 6/11/2021 6/7/21   Kasey Cevallos MD     I have reviewed home medications with patient personally  Allergies:    Allergies   Allergen Reactions    Bee Venom Hives    Lidocaine Hives    Penicillins Hives       Social History:     Marital Status: /Civil Union   Patient Pre-hospital Living Situation:  With her son  Patient Pre-hospital Level of Mobility:  Independent  Patient Pre-hospital Diet Restrictions:  None  Substance Use History:   Social History     Substance and Sexual Activity   Alcohol Use No    Comment: ALCOHOL USE AS PER ALLSCRIPTS     Social History Tobacco Use   Smoking Status Current Every Day Smoker    Packs/day: 1 00    Types: Cigarettes   Smokeless Tobacco Never Used   Tobacco Comment    CURRENT TOBACCO USE     Social History     Substance and Sexual Activity   Drug Use No       Family History:    Family History   Problem Relation Age of Onset    Hepatitis Mother     Lupus Mother     Hyperlipidemia Father     Arthritis Family     Lung cancer Family         UNSPECIFIED LATERALITY, UNSPECIFIED PART OF LUNG       Physical Exam:     Vitals:   Blood Pressure: 117/57 (06/11/21 1530)  Pulse: 102 (06/11/21 1530)  Temperature: 98 5 °F (36 9 °C) (06/11/21 0938)  Temp Source: Oral (06/11/21 1516)  Respirations: 19 (06/11/21 1530)  Height: 5' 5" (165 1 cm) (06/11/21 0929)  Weight - Scale: 72 6 kg (160 lb) (06/11/21 0929)  SpO2: 98 % (06/11/21 1530)    Physical Exam  HENT:      Head: Normocephalic  Mouth/Throat:      Mouth: Mucous membranes are moist    Eyes:      Extraocular Movements: Extraocular movements intact  Pupils: Pupils are equal, round, and reactive to light  Neck:      Musculoskeletal: Normal range of motion  Cardiovascular:      Rate and Rhythm: Normal rate and regular rhythm  Heart sounds: No murmur  Pulmonary:      Effort: Pulmonary effort is normal  No respiratory distress  Breath sounds: No wheezing or rales  Abdominal:      Palpations: Abdomen is soft  Tenderness: There is no abdominal tenderness  Musculoskeletal: Normal range of motion  General: No swelling  Skin:     General: Skin is warm  Neurological:      Mental Status: She is alert and oriented to person, place, and time  Sensory: Sensory deficit (Left arm and hands) present  Motor: No weakness  Coordination: Finger-Nose-Finger Test and Heel to [employer] normal    Psychiatric:         Mood and Affect: Mood normal              Additional Data:     Lab Results: I have personally reviewed pertinent reports        Results from last 7 days   Lab Units 06/11/21  1004   WBC Thousand/uL 8 44   HEMOGLOBIN g/dL 9 5*   HEMATOCRIT % 32 8*   PLATELETS Thousands/uL 429*   NEUTROS PCT % 73   LYMPHS PCT % 17   MONOS PCT % 8   EOS PCT % 1     Results from last 7 days   Lab Units 06/11/21  1004   SODIUM mmol/L 142   POTASSIUM mmol/L 4 0   CHLORIDE mmol/L 106   CO2 mmol/L 26   BUN mg/dL 8   CREATININE mg/dL 0 72   ANION GAP mmol/L 10   CALCIUM mg/dL 8 8   ALBUMIN g/dL 3 9   TOTAL BILIRUBIN mg/dL 0 30   ALK PHOS U/L 74   ALT U/L 27   AST U/L 19   GLUCOSE RANDOM mg/dL 81                       Imaging: I have personally reviewed pertinent reports  CTA head and neck with and without contrast   Final Result by Nathanael Mercado DO (06/11 1145)      Normal CT of the brain  Normal CTA of the neck and brain  Workstation performed: [id number]         XR chest 1 view portable   ED Interpretation by Arnol Pedersen DO (06/11 1114)   No acute abnormality in the chest       Final Result by Kyle Wisdom MD (06/11 1225)      No acute cardiopulmonary disease  Workstation performed: [id number]         MRI Inpatient Order    (Results Pending)       EKG, Pathology, and Other Studies Reviewed on Admission:   · EKG:  Normal sinus rhythm    Allscripts / Epic Records Reviewed: Yes     ** Please Note: This note has been constructed using a voice recognition system   **

## 2021-06-11 NOTE — PLAN OF CARE
Problem: Potential for Falls  Goal: Patient will remain free of falls  Description: INTERVENTIONS:  - Assess patient frequently for physical needs  -  Identify cognitive and physical deficits and behaviors that affect risk of falls    -  [place] fall precautions as indicated by assessment   - Educate patient/family on patient safety including physical limitations  - Instruct patient to call for assistance with activity based on assessment  - Modify environment to reduce risk of injury  - Consider OT/PT consult to assist with strengthening/mobility  Outcome: Progressing     Problem: PAIN - ADULT  Goal: Verbalizes/displays adequate comfort level or baseline comfort level  Description: Interventions:  - Encourage patient to monitor pain and request assistance  - Assess pain using appropriate pain scale  - Administer analgesics based on type and severity of pain and evaluate response  - Implement non-pharmacological measures as appropriate and evaluate response  - Consider cultural and social influences on pain and pain management  - Notify physician/advanced practitioner if interventions unsuccessful or patient reports new pain  Outcome: Progressing     Problem: INFECTION - ADULT  Goal: Absence or prevention of progression during hospitalization  Description: INTERVENTIONS:  - Assess and monitor for signs and symptoms of infection  - Monitor lab/diagnostic results  - Monitor all insertion sites, i e  indwelling lines, tubes, and drains  - Monitor endotracheal if appropriate and nasal secretions for changes in amount and color  - [place] appropriate cooling/warming therapies per order  - Administer medications as ordered  - Instruct and encourage patient and family to use good hand hygiene technique  - Identify and instruct in appropriate isolation precautions for identified infection/condition  Outcome: Progressing  Goal: Absence of fever/infection during neutropenic period  Description: INTERVENTIONS:  - Monitor WBC    Outcome: Progressing     Problem: SAFETY ADULT  Goal: Patient will remain free of falls  Description: INTERVENTIONS:  - Assess patient frequently for physical needs  -  Identify cognitive and physical deficits and behaviors that affect risk of falls    -  [place] fall precautions as indicated by assessment   - Educate patient/family on patient safety including physical limitations  - Instruct patient to call for assistance with activity based on assessment  - Modify environment to reduce risk of injury  - Consider OT/PT consult to assist with strengthening/mobility  Outcome: Progressing  Goal: Maintain or return to baseline ADL function  Description: INTERVENTIONS:  -  Assess patient's ability to carry out ADLs; assess patient's baseline for ADL function and identify physical deficits which impact ability to perform ADLs (bathing, care of mouth/teeth, toileting, grooming, dressing, etc )  - Assess/evaluate cause of self-care deficits   - Assess range of motion  - Assess patient's mobility; develop plan if impaired  - Assess patient's need for assistive devices and provide as appropriate  - Encourage maximum independence but intervene and supervise when necessary  - Involve family in performance of ADLs  - Assess for home care needs following discharge   - Consider OT consult to assist with ADL evaluation and planning for discharge  - Provide patient education as appropriate  Outcome: Progressing  Goal: Maintain or return mobility status to optimal level  Description: INTERVENTIONS:  - Assess patient's baseline mobility status (ambulation, transfers, stairs, etc )    - Identify cognitive and physical deficits and behaviors that affect mobility  - Identify mobility aids required to assist with transfers and/or ambulation (gait belt, sit-to-stand, lift, walker, cane, etc )  - [place] fall precautions as indicated by assessment  - Record patient progress and toleration of activity level on Mobility SBAR; progress patient to next Phase/Stage  - Instruct patient to call for assistance with activity based on assessment  - Consider rehabilitation consult to assist with strengthening/weightbearing, etc   Outcome: Progressing     Problem: DISCHARGE PLANNING  Goal: Discharge to home or other facility with appropriate resources  Description: INTERVENTIONS:  - Identify barriers to discharge w/patient and caregiver  - Arrange for needed discharge resources and transportation as appropriate  - Identify discharge learning needs (meds, wound care, etc )  - Arrange for interpretive services to assist at discharge as needed  - Refer to Case Management Department for coordinating discharge planning if the patient needs post-hospital services based on physician/advanced practitioner order or complex needs related to functional status, cognitive ability, or social support system  Outcome: Progressing     Problem: Knowledge Deficit  Goal: Patient/family/caregiver demonstrates understanding of disease process, treatment plan, medications, and discharge instructions  Description: Complete learning assessment and assess knowledge base    Interventions:  - Provide teaching at level of understanding  - Provide teaching via preferred learning methods  Outcome: Progressing

## 2021-06-12 VITALS
OXYGEN SATURATION: 98 % | TEMPERATURE: 98.2 F | HEIGHT: 65 IN | RESPIRATION RATE: 18 BRPM | WEIGHT: 160 LBS | DIASTOLIC BLOOD PRESSURE: 71 MMHG | HEART RATE: 89 BPM | SYSTOLIC BLOOD PRESSURE: 122 MMHG | BODY MASS INDEX: 26.66 KG/M2

## 2021-06-12 LAB
ANION GAP SERPL CALCULATED.3IONS-SCNC: 8 MMOL/L (ref 4–13)
BUN SERPL-MCNC: 16 MG/DL (ref 5–25)
CALCIUM SERPL-MCNC: 8.3 MG/DL (ref 8.3–10.1)
CHLORIDE SERPL-SCNC: 103 MMOL/L (ref 100–108)
CHOLEST SERPL-MCNC: 152 MG/DL (ref 50–200)
CO2 SERPL-SCNC: 25 MMOL/L (ref 21–32)
CREAT SERPL-MCNC: 0.78 MG/DL (ref 0.6–1.3)
ERYTHROCYTE [DISTWIDTH] IN BLOOD BY AUTOMATED COUNT: 19.7 % (ref 11.6–15.1)
EST. AVERAGE GLUCOSE BLD GHB EST-MCNC: 100 MG/DL
FERRITIN SERPL-MCNC: 3 NG/ML (ref 8–388)
GFR SERPL CREATININE-BSD FRML MDRD: 94 ML/MIN/1.73SQ M
GLUCOSE P FAST SERPL-MCNC: 100 MG/DL (ref 65–99)
GLUCOSE SERPL-MCNC: 100 MG/DL (ref 65–140)
HBA1C MFR BLD: 5.1 %
HCT VFR BLD AUTO: 28.6 % (ref 34.8–46.1)
HDLC SERPL-MCNC: 69 MG/DL
HGB BLD-MCNC: 8.4 G/DL (ref 11.5–15.4)
IRON SATN MFR SERPL: 4 %
IRON SERPL-MCNC: 18 UG/DL (ref 50–170)
LDLC SERPL CALC-MCNC: 75 MG/DL (ref 0–100)
MCH RBC QN AUTO: 22.8 PG (ref 26.8–34.3)
MCHC RBC AUTO-ENTMCNC: 29.4 G/DL (ref 31.4–37.4)
MCV RBC AUTO: 78 FL (ref 82–98)
PLATELET # BLD AUTO: 352 THOUSANDS/UL (ref 149–390)
PMV BLD AUTO: 9 FL (ref 8.9–12.7)
POTASSIUM SERPL-SCNC: 4.1 MMOL/L (ref 3.5–5.3)
RBC # BLD AUTO: 3.69 MILLION/UL (ref 3.81–5.12)
SODIUM SERPL-SCNC: 136 MMOL/L (ref 136–145)
TIBC SERPL-MCNC: 429 UG/DL (ref 250–450)
TRIGL SERPL-MCNC: 41 MG/DL
WBC # BLD AUTO: 6.44 THOUSAND/UL (ref 4.31–10.16)

## 2021-06-12 PROCEDURE — 83036 HEMOGLOBIN GLYCOSYLATED A1C: CPT | Performed by: INTERNAL MEDICINE

## 2021-06-12 PROCEDURE — 85027 COMPLETE CBC AUTOMATED: CPT | Performed by: INTERNAL MEDICINE

## 2021-06-12 PROCEDURE — 80061 LIPID PANEL: CPT | Performed by: INTERNAL MEDICINE

## 2021-06-12 PROCEDURE — 83540 ASSAY OF IRON: CPT | Performed by: INTERNAL MEDICINE

## 2021-06-12 PROCEDURE — 99217 PR OBSERVATION CARE DISCHARGE MANAGEMENT: CPT | Performed by: INTERNAL MEDICINE

## 2021-06-12 PROCEDURE — 80048 BASIC METABOLIC PNL TOTAL CA: CPT | Performed by: INTERNAL MEDICINE

## 2021-06-12 PROCEDURE — 82728 ASSAY OF FERRITIN: CPT | Performed by: INTERNAL MEDICINE

## 2021-06-12 PROCEDURE — 83550 IRON BINDING TEST: CPT | Performed by: INTERNAL MEDICINE

## 2021-06-12 RX ADMIN — ACETAMINOPHEN 650 MG: 325 TABLET, FILM COATED ORAL at 12:49

## 2021-06-12 RX ADMIN — NICOTINE 1 PATCH: 21 PATCH, EXTENDED RELEASE TRANSDERMAL at 08:16

## 2021-06-12 RX ADMIN — ONDANSETRON 4 MG: 2 INJECTION INTRAMUSCULAR; INTRAVENOUS at 00:08

## 2021-06-12 RX ADMIN — ENOXAPARIN SODIUM 40 MG: 40 INJECTION SUBCUTANEOUS at 08:16

## 2021-06-12 NOTE — PLAN OF CARE
Problem: Potential for Falls  Goal: Patient will remain free of falls  Description: INTERVENTIONS:  - Assess patient frequently for physical needs  -  Identify cognitive and physical deficits and behaviors that affect risk of falls    -  [place] fall precautions as indicated by assessment   - Educate patient/family on patient safety including physical limitations  - Instruct patient to call for assistance with activity based on assessment  - Modify environment to reduce risk of injury  - Consider OT/PT consult to assist with strengthening/mobility  6/12/2021 1440 by Nancy Olmos RN  Outcome: Progressing  6/12/2021 1439 by Nancy Olmos RN  Outcome: Adequate for Discharge  6/12/2021 0821 by Nancy Olmos RN  Outcome: Progressing     Problem: PAIN - ADULT  Goal: Verbalizes/displays adequate comfort level or baseline comfort level  Description: Interventions:  - Encourage patient to monitor pain and request assistance  - Assess pain using appropriate pain scale  - Administer analgesics based on type and severity of pain and evaluate response  - Implement non-pharmacological measures as appropriate and evaluate response  - Consider cultural and social influences on pain and pain management  - Notify physician/advanced practitioner if interventions unsuccessful or patient reports new pain  6/12/2021 1440 by Nancy Olmos RN  Outcome: Progressing  6/12/2021 1439 by Nancy Olmos RN  Outcome: Adequate for Discharge  6/12/2021 0821 by Nancy Olmos RN  Outcome: Progressing     Problem: INFECTION - ADULT  Goal: Absence or prevention of progression during hospitalization  Description: INTERVENTIONS:  - Assess and monitor for signs and symptoms of infection  - Monitor lab/diagnostic results  - Monitor all insertion sites, i e  indwelling lines, tubes, and drains  - Monitor endotracheal if appropriate and nasal secretions for changes in amount and color  - [place] appropriate cooling/warming therapies per order  - Administer medications as ordered  - Instruct and encourage patient and family to use good hand hygiene technique  - Identify and instruct in appropriate isolation precautions for identified infection/condition  6/12/2021 1440 by Barbara Estes RN  Outcome: Progressing  6/12/2021 1439 by Barbara Estes RN  Outcome: Adequate for Discharge  6/12/2021 0821 by Barbara Estes RN  Outcome: Progressing  Goal: Absence of fever/infection during neutropenic period  Description: INTERVENTIONS:  - Monitor WBC    6/12/2021 1440 by Barbara Estes RN  Outcome: Progressing  6/12/2021 1439 by Barbara Estes RN  Outcome: Adequate for Discharge  6/12/2021 0821 by Barbara Estes RN  Outcome: Progressing     Problem: SAFETY ADULT  Goal: Patient will remain free of falls  Description: INTERVENTIONS:  - Assess patient frequently for physical needs  -  Identify cognitive and physical deficits and behaviors that affect risk of falls    -  [place] fall precautions as indicated by assessment   - Educate patient/family on patient safety including physical limitations  - Instruct patient to call for assistance with activity based on assessment  - Modify environment to reduce risk of injury  - Consider OT/PT consult to assist with strengthening/mobility  6/12/2021 1440 by Barbara Estes RN  Outcome: Progressing  6/12/2021 1439 by Barbara Estes RN  Outcome: Adequate for Discharge  6/12/2021 0821 by Barbara Estes RN  Outcome: Progressing  Goal: Maintain or return to baseline ADL function  Description: INTERVENTIONS:  -  Assess patient's ability to carry out ADLs; assess patient's baseline for ADL function and identify physical deficits which impact ability to perform ADLs (bathing, care of mouth/teeth, toileting, grooming, dressing, etc )  - Assess/evaluate cause of self-care deficits   - Assess range of motion  - Assess patient's mobility; develop plan if impaired  - Assess patient's need for assistive devices and provide as appropriate  - Encourage maximum independence but intervene and supervise when necessary  - Involve family in performance of ADLs  - Assess for home care needs following discharge   - Consider OT consult to assist with ADL evaluation and planning for discharge  - Provide patient education as appropriate  6/12/2021 1440 by Meng Pina RN  Outcome: Progressing  6/12/2021 1439 by Meng Pina RN  Outcome: Adequate for Discharge  6/12/2021 0821 by Meng Pina RN  Outcome: Progressing  Goal: Maintain or return mobility status to optimal level  Description: INTERVENTIONS:  - Assess patient's baseline mobility status (ambulation, transfers, stairs, etc )    - Identify cognitive and physical deficits and behaviors that affect mobility  - Identify mobility aids required to assist with transfers and/or ambulation (gait belt, sit-to-stand, lift, walker, cane, etc )  - [place] fall precautions as indicated by assessment  - Record patient progress and toleration of activity level on Mobility SBAR; progress patient to next Phase/Stage  - Instruct patient to call for assistance with activity based on assessment  - Consider rehabilitation consult to assist with strengthening/weightbearing, etc   6/12/2021 1440 by Meng Pina RN  Outcome: Progressing  6/12/2021 1439 by Meng Pina RN  Outcome: Adequate for Discharge  6/12/2021 0821 by Meng Pina RN  Outcome: Progressing     Problem: DISCHARGE PLANNING  Goal: Discharge to home or other facility with appropriate resources  Description: INTERVENTIONS:  - Identify barriers to discharge w/patient and caregiver  - Arrange for needed discharge resources and transportation as appropriate  - Identify discharge learning needs (meds, wound care, etc )  - Arrange for interpretive services to assist at discharge as needed  - Refer to Case Management Department for coordinating discharge planning if the patient needs post-hospital services based on physician/advanced practitioner order or complex needs related to functional status, cognitive ability, or social support system  6/12/2021 1440 by Zeynep Saini RN  Outcome: Progressing  6/12/2021 1439 by Zeynep Saini RN  Outcome: Adequate for Discharge  6/12/2021 0821 by Zeynep Saini RN  Outcome: Progressing     Problem: Knowledge Deficit  Goal: Patient/family/caregiver demonstrates understanding of disease process, treatment plan, medications, and discharge instructions  Description: Complete learning assessment and assess knowledge base    Interventions:  - Provide teaching at level of understanding  - Provide teaching via preferred learning methods  6/12/2021 1440 by Zeynep Saini RN  Outcome: Progressing  6/12/2021 1439 by Zeynep Saini RN  Outcome: Adequate for Discharge  6/12/2021 0821 by Zeynep Saini RN  Outcome: Progressing

## 2021-06-12 NOTE — ASSESSMENT & PLAN NOTE
Patient presented with hemoglobin 9 5  MCV 78  Patient stated she has heavy periods    Patient denies melena      Results from last 7 days   Lab Units 06/12/21  0512 06/11/21  1004   WBC Thousand/uL 6 44 8 44   HEMOGLOBIN g/dL 8 4* 9 5*   HEMATOCRIT % 28 6* 32 8*   MCV fL 78* 78*   PLATELETS Thousands/uL 352 429*     No signs of any excess bleeding, patient does have menstrual bleeding

## 2021-06-12 NOTE — PLAN OF CARE
Problem: Potential for Falls  Goal: Patient will remain free of falls  Description: INTERVENTIONS:  - Assess patient frequently for physical needs  -  Identify cognitive and physical deficits and behaviors that affect risk of falls    -  [place] fall precautions as indicated by assessment   - Educate patient/family on patient safety including physical limitations  - Instruct patient to call for assistance with activity based on assessment  - Modify environment to reduce risk of injury  - Consider OT/PT consult to assist with strengthening/mobility  6/12/2021 1439 by Kerwin Jaquez RN  Outcome: Adequate for Discharge  6/12/2021 0821 by Kerwin Jaquez RN  Outcome: Progressing     Problem: PAIN - ADULT  Goal: Verbalizes/displays adequate comfort level or baseline comfort level  Description: Interventions:  - Encourage patient to monitor pain and request assistance  - Assess pain using appropriate pain scale  - Administer analgesics based on type and severity of pain and evaluate response  - Implement non-pharmacological measures as appropriate and evaluate response  - Consider cultural and social influences on pain and pain management  - Notify physician/advanced practitioner if interventions unsuccessful or patient reports new pain  6/12/2021 1439 by Kerwin Jaquez RN  Outcome: Adequate for Discharge  6/12/2021 0821 by Kerwin Jaquez RN  Outcome: Progressing     Problem: INFECTION - ADULT  Goal: Absence or prevention of progression during hospitalization  Description: INTERVENTIONS:  - Assess and monitor for signs and symptoms of infection  - Monitor lab/diagnostic results  - Monitor all insertion sites, i e  indwelling lines, tubes, and drains  - Monitor endotracheal if appropriate and nasal secretions for changes in amount and color  - [place] appropriate cooling/warming therapies per order  - Administer medications as ordered  - Instruct and encourage patient and family to use good hand hygiene technique  - Identify and instruct in appropriate isolation precautions for identified infection/condition  6/12/2021 1439 by Zeynep Saini RN  Outcome: Adequate for Discharge  6/12/2021 0821 by Zeynep Saini RN  Outcome: Progressing  Goal: Absence of fever/infection during neutropenic period  Description: INTERVENTIONS:  - Monitor WBC    6/12/2021 1439 by Zeynep Saini RN  Outcome: Adequate for Discharge  6/12/2021 0821 by Zeynep Saini RN  Outcome: Progressing     Problem: SAFETY ADULT  Goal: Patient will remain free of falls  Description: INTERVENTIONS:  - Assess patient frequently for physical needs  -  Identify cognitive and physical deficits and behaviors that affect risk of falls    -  [place] fall precautions as indicated by assessment   - Educate patient/family on patient safety including physical limitations  - Instruct patient to call for assistance with activity based on assessment  - Modify environment to reduce risk of injury  - Consider OT/PT consult to assist with strengthening/mobility  6/12/2021 1439 by Zeynep Saini RN  Outcome: Adequate for Discharge  6/12/2021 0821 by Zeynep Saini RN  Outcome: Progressing  Goal: Maintain or return to baseline ADL function  Description: INTERVENTIONS:  -  Assess patient's ability to carry out ADLs; assess patient's baseline for ADL function and identify physical deficits which impact ability to perform ADLs (bathing, care of mouth/teeth, toileting, grooming, dressing, etc )  - Assess/evaluate cause of self-care deficits   - Assess range of motion  - Assess patient's mobility; develop plan if impaired  - Assess patient's need for assistive devices and provide as appropriate  - Encourage maximum independence but intervene and supervise when necessary  - Involve family in performance of ADLs  - Assess for home care needs following discharge   - Consider OT consult to assist with ADL evaluation and planning for discharge  - Provide patient education as appropriate  6/12/2021 1439 by Indio Brown RN  Outcome: Adequate for Discharge  6/12/2021 0821 by Indio Brown RN  Outcome: Progressing  Goal: Maintain or return mobility status to optimal level  Description: INTERVENTIONS:  - Assess patient's baseline mobility status (ambulation, transfers, stairs, etc )    - Identify cognitive and physical deficits and behaviors that affect mobility  - Identify mobility aids required to assist with transfers and/or ambulation (gait belt, sit-to-stand, lift, walker, cane, etc )  - [place] fall precautions as indicated by assessment  - Record patient progress and toleration of activity level on Mobility SBAR; progress patient to next Phase/Stage  - Instruct patient to call for assistance with activity based on assessment  - Consider rehabilitation consult to assist with strengthening/weightbearing, etc   6/12/2021 1439 by Indio Brown RN  Outcome: Adequate for Discharge  6/12/2021 0821 by Indio Brown RN  Outcome: Progressing     Problem: DISCHARGE PLANNING  Goal: Discharge to home or other facility with appropriate resources  Description: INTERVENTIONS:  - Identify barriers to discharge w/patient and caregiver  - Arrange for needed discharge resources and transportation as appropriate  - Identify discharge learning needs (meds, wound care, etc )  - Arrange for interpretive services to assist at discharge as needed  - Refer to Case Management Department for coordinating discharge planning if the patient needs post-hospital services based on physician/advanced practitioner order or complex needs related to functional status, cognitive ability, or social support system  6/12/2021 1439 by Indio Brown RN  Outcome: Adequate for Discharge  6/12/2021 0821 by Indio Brown RN  Outcome: Progressing     Problem: Knowledge Deficit  Goal: Patient/family/caregiver demonstrates understanding of disease process, treatment plan, medications, and discharge instructions  Description: Complete learning assessment and assess knowledge base    Interventions:  - Provide teaching at level of understanding  - Provide teaching via preferred learning methods  6/12/2021 1439 by Bruna Arias RN  Outcome: Adequate for Discharge  6/12/2021 0821 by Bruna Arias RN  Outcome: Progressing

## 2021-06-12 NOTE — ASSESSMENT & PLAN NOTE
Patient is undergoing divorce and suffers lots of stress  Started smoking recently 1 pack per day  Counseling patient smoking sensation  Nicotine patch at discharge

## 2021-06-12 NOTE — ASSESSMENT & PLAN NOTE
Patient presents with left arm numbness and tingling started 6:30 a m  Associated with dizziness, headache, vision changes and nausea and vomiting  Nausea and vomiting has improved, however, left arm numbness persists  Patient denies fever, chills, weakness in extremities  No chest pain, abdominal pain, dysuria or bowel movement changes  On exam, patient is awake and alert  Sensory deficit in left arm and hands  No muscle weakness  Nystagmus negative  Finger-to-nose and heel to chin negative  EKG revealed normal sinus rhythm  CT brain and CTA negative  MRI also negative  Neurology recommended that patient can be discharged with ENT follow-up for her dizziness, patient does not have any stroke per her clinical symptomatology or radiological imaging    Patient hemodynamically stable for discharge

## 2021-06-12 NOTE — DISCHARGE SUMMARY
[facility]  Discharge- Marlene Morales 1980, 42 y o  female MRN: [id number]  Unit/Bed#: -01 Encounter: [id number]  Primary Care Provider: Ruchi Henderson MD   Date and time admitted to hospital: 6/11/2021  9:26 AM      Admitting Provider:  Serena Peralta MD  Discharge Provider:  Serena Peralta MD  Admission Date: 6/11/2021       Discharge Date: 06/12/21   LOS: 0  Primary Care Physician at Discharge: Ruchi Henderson [phone]    HOSPITAL COURSE:  Marlene Morales is a 42 y o  female who presented with left arm numbness and tingling that started at 6:30 a m  Monia Le She had associated dizziness, headache, vision changes and nausea and vomiting  The nausea and vomiting have improved but the left arm numbness still persisted  Patient is much better from the same  She has no weakness and is able to ambulate and catch things and move around normally  CT head, CTA and MRI were negative  Patient is hemodynamically stable for discharge  A urology recommended outpatient ENT follow-up  Patient has been advised rest from work for 5 days as she deals with some psychological issues related to her personal life  REASON FOR ADMISSION/ ADMISSION DIAGNOSES    Left arm numbness    DISCHARGE DIAGNOSES  * Left arm numbness  Assessment & Plan  Patient presents with left arm numbness and tingling started 6:30 a m  Associated with dizziness, headache, vision changes and nausea and vomiting  Nausea and vomiting has improved, however, left arm numbness persists  Patient denies fever, chills, weakness in extremities  No chest pain, abdominal pain, dysuria or bowel movement changes  On exam, patient is awake and alert  Sensory deficit in left arm and hands  No muscle weakness  Nystagmus negative    Finger-to-nose and heel to chin negative  EKG revealed normal sinus rhythm  CT brain and CTA negative  MRI also negative  Neurology recommended that patient can be discharged with ENT follow-up for her dizziness, patient does not have any stroke per her clinical symptomatology or radiological imaging  Patient hemodynamically stable for discharge    Anemia  Assessment & Plan  Patient presented with hemoglobin 9 5  MCV 78  Patient stated she has heavy periods  Patient denies melena      Results from last 7 days   Lab Units 06/12/21  0512 06/11/21  1004   WBC Thousand/uL 6 44 8 44   HEMOGLOBIN g/dL 8 4* 9 5*   HEMATOCRIT % 28 6* 32 8*   MCV fL 78* 78*   PLATELETS Thousands/uL 352 429*     No signs of any excess bleeding, patient does have menstrual bleeding    Current every day smoker  Assessment & Plan  Patient is undergoing divorce and suffers lots of stress  Started smoking recently 1 pack per day  Counseling patient smoking sensation  Nicotine patch at discharge    Anxiety  Assessment & Plan  Xanax as needed      [facility]  * No surgery found *    RADIOLOGY RESULTS  Cta Head And Neck With And Without Contrast    Result Date: 6/11/2021  Impression: Normal CT of the brain  Normal CTA of the neck and brain  Workstation performed: [id number]     Xr Chest 1 View Portable    Result Date: 6/11/2021  Impression: No acute cardiopulmonary disease  Workstation performed: [id number]     Mri Brain Wo Contrast    Result Date: 6/12/2021  Impression: No acute ischemic disease  Unremarkable MRI of the brain   Workstation performed: [id number]       LABS  Results from last 7 days   Lab Units 06/12/21  0512 06/11/21  1004   WBC Thousand/uL 6 44 8 44   HEMOGLOBIN g/dL 8 4* 9 5*   HEMATOCRIT % 28 6* 32 8*   MCV fL 78* 78*   PLATELETS Thousands/uL 352 429*     Results from last 7 days   Lab Units 06/12/21  0512 06/11/21  1004   SODIUM mmol/L 136 142   POTASSIUM mmol/L 4 1 4 0   CHLORIDE mmol/L 103 106   CO2 mmol/L 25 26   BUN mg/dL 16 8   CREATININE mg/dL 0 78 0 72   CALCIUM mg/dL 8 3 8 8   ALBUMIN g/dL  --  3 9   TOTAL BILIRUBIN mg/dL  --  0 30   ALK PHOS U/L  -- 74   ALT U/L  --  27   AST U/L  --  19   EGFR ml/min/1 73sq m 94 104   GLUCOSE RANDOM mg/dL 100 81     Results from last 7 days   Lab Units 06/11/21  1026   TROPONIN I ng/mL <0 02                  Results from last 7 days   Lab Units 06/12/21  0512   HEMOGLOBIN A1C % 5 1             Results from last 7 days   Lab Units 06/12/21  0512   TRIGLYCERIDES mg/dL 41   CHOLESTEROL mg/dL 152   LDL CALC mg/dL 75   HDL mg/dL 69       Cultures:   Results from last 7 days   Lab Units 06/11/21  1120   COLOR UA  Yellow   CLARITY UA  Clear   SPEC GRAV UA  1 010   PH UA  6 0   LEUKOCYTES UA  Negative   NITRITE UA  Negative   GLUCOSE UA mg/dl Negative   KETONES UA mg/dl Negative   BILIRUBIN UA  Negative   BLOOD UA  Negative                 PHYSICAL EXAM:  Vitals:   Blood Pressure: 122/71 (06/12/21 1100)  Pulse: 89 (06/12/21 1100)  Temperature: 98 2 °F (36 8 °C) (06/12/21 1100)  Temp Source: Oral (06/12/21 1100)  Respirations: 18 (06/12/21 1100)  Height: 5' 5" (165 1 cm) (06/11/21 0929)  Weight - Scale: 72 6 kg (160 lb) (06/11/21 0929)  SpO2: 98 % (06/12/21 1100)    General appearance: alert, appears stated age and cooperative  HEENT - atraumatic and normocephalic  Neck- supple  Skin - no fresh rash  Extremities no fresh focal deformities  Cardiovascular- S1-S2 heard  Respiratory- bilateral air entry present, no crackles or rhonchi  Skin - no fresh rash  Abdomen - normal bowel sounds present, no rebound tenderness  CNS- No fresh focal deficits except for some subjective left arm numbness  Psych- no acute psychosis     Planned Re-admission:  No  Discharge Disposition: Home/Self Care    Medications   · Summary of Medication Adjustments made as a result of this hospitalization:  No changes  · Medication Dosing Tapers - Please refer to Discharge Medication List for details on any medication dosing tapers (if applicable to patient)  · Discharge Medication List: See after visit summary for reconciled discharge medications       Diet restrictions:  None        Diet Orders   (From admission, onward)             Start     Ordered    06/11/21 1416  Diet Regular; Regular House  Diet effective now     Question Answer Comment   Diet Type Regular    Regular Regular House    RD to adjust diet per protocol? Yes        06/11/21 1415              Activity restrictions: No strenuous activity  Discharge Condition: stable    Outpatient Follow-Up and Discharge Instructions  See after visit summary section titled Discharge Instructions for information provided to patient and family  Code Status: Level 1 - Full Code      MD Sejal Gonzalez [facility]    ** Please Note: This note has been constructed using a voice recognition system   **

## 2021-06-12 NOTE — UTILIZATION REVIEW
Initial Clinical Review    Admission: Date/Time/Statement:   Admission Orders (From admission, onward)     Ordered        06/11/21 1207  Place in Observation  Once                   Orders Placed This Encounter   Procedures    Place in Observation     Standing Status:   Standing     Number of Occurrences:   1     Order Specific Question:   Level of Care     Answer:   Med Surg [16]     ED Arrival Information     Expected Arrival Acuity Means of Arrival Escorted By Service Admission Type    - 6/11/2021 09:26 Urgent Ambulance [address]  [facility] General Medicine Urgent    Arrival Complaint    -        Chief Complaint   Patient presents with    Numbness     pt reports awaking with tingling in left hand  pt reports increased numbness to left arm and left leg, dizziness, and nuasea  pt reports increased stress at home  pt reports large amount of alcohol consumption last night  Initial Presentation:   42  Y O female  Presents to ED via  EMS from home with left arm numbness which  Started the am of admission with dizziness, headache, vision changes, nausea and 6-7  Episodes of vomiting  Does have  A  History of vertigo  CT/CTA  Brain negative  Additional PMH  Is  Smoker, anxiety  Labs  Show  Hemoglobin   9 5  EKG  NSR  Admit  Observation with   Left arm numbness and anemia  And plan is  Monitor labs, MRI brain, 2 DE, neuro checks, neuro consult and nicotine patch           Date:          Day 2:     ED Triage Vitals   Temperature Pulse Respirations Blood Pressure SpO2   06/11/21 0938 06/11/21 0929 06/11/21 0929 06/11/21 0929 06/11/21 0929   98 5 °F (36 9 °C) 79 16 117/60 100 %      Temp Source Heart Rate Source Patient Position - Orthostatic VS BP Location FiO2 (%)   06/11/21 0938 06/11/21 0929 06/11/21 0929 06/11/21 0929 --   Oral Monitor Lying Right arm       Pain Score       06/11/21 1603       Med Not Given for Pain - for MAR use only          Wt Readings from Last 1 Encounters:   06/11/21 72 6 kg (160 lb)     Additional Vital Signs:   97 9 °F (36 6 °C)  80  16  112/76  --  97 %  None (Room air)  --     06/12/21 03:03:28  98 °F (36 7 °C)  77  16  108/72  84  96 %  --  --   06/12/21 00:01:10  98 °F (36 7 °C)  83  16  99/59  72  96 %  --  --   06/11/21 2151  98 °F (36 7 °C)  90  18  106/60  --  --  --  --   06/11/21 2000  98 2 °F (36 8 °C)  88  16  103/60  --  98 %  None (Room air)  Lying   06/11/21 1800  97 8 °F (36 6 °C)  88  18  100/59  73  98 %  None (Room air)  Lying   06/11/21 1700  97 8 °F (36 6 °C)  88  18  100/59  73  99 %  None (Room air)  Lying   06/11/21 1630  --  92  21  94/54  66  99 %  None (Room air)  Lying   06/11/21 1530  --  102  19  117/57  82  98 %  None (Room air)  Lying   06/11/21 1330  --  88  23Abnormal   122/58  83  100 %  None (Room air)  Lying   06/11/21 1230  --  83  20  110/80  92  100 %  None (Room air)  Lying   06/11/21 1150  --  83  16  136/69  --  100 %  None (Room air)  Sitting   06/11/21 1130  --  84  21  115/60  81  100 %  None (Room air)  Lying   06/11/21 1030  --  79  19  131/65  --  99 %  --  Lying   06/11/21 0938  98 5 °F (36 9 °C)  --  --  --  --  --  --  --   06/11/21 0929  --  79  16  117/60  --  100 %  None (Room air)  Lying         Pertinent Labs/Diagnostic Test Results:   CTA  Head/neck  ( 6/11)   Normal  CXR  ( 6/11)    NAD  EKG    ( 6/11)     NSR      HR  84     Normal QRS    Normal T waves      Results from last 7 days   Lab Units 06/12/21  0512 06/11/21  1004   WBC Thousand/uL 6 44 8 44   HEMOGLOBIN g/dL 8 4* 9 5*   HEMATOCRIT % 28 6* 32 8*   PLATELETS Thousands/uL 352 429*   NEUTROS ABS Thousands/µL  --  6 11         Results from last 7 days   Lab Units 06/12/21  0512 06/11/21  1004   SODIUM mmol/L 136 142   POTASSIUM mmol/L 4 1 4 0   CHLORIDE mmol/L 103 106   CO2 mmol/L 25 26   ANION GAP mmol/L 8 10   BUN mg/dL 16 8   CREATININE mg/dL 0 78 0 72   EGFR ml/min/1 73sq m 94 104   CALCIUM mg/dL 8 3 8 8   MAGNESIUM mg/dL  --  2 1     Results from last 7 days   Lab Units 06/11/21  1004   AST U/L 19   ALT U/L 27   ALK PHOS U/L 74   TOTAL PROTEIN g/dL 7 7   ALBUMIN g/dL 3 9   TOTAL BILIRUBIN mg/dL 0 30         Results from last 7 days   Lab Units 06/12/21  0512 06/11/21  1004   GLUCOSE RANDOM mg/dL 100 81           Results from last 7 days   Lab Units 06/11/21  1026   TROPONIN I ng/mL <0 02           Results from last 7 days   Lab Units 06/11/21  1004   LIPASE u/L 147             Results from last 7 days   Lab Units 06/11/21  1120   CLARITY UA  Clear   COLOR UA  Yellow   SPEC GRAV UA  1 010   PH UA  6 0   GLUCOSE UA mg/dl Negative   KETONES UA mg/dl Negative   BLOOD UA  Negative   PROTEIN UA mg/dl Negative   NITRITE UA  Negative   BILIRUBIN UA  Negative   UROBILINOGEN UA E U /dl 0 2   LEUKOCYTES UA  Negative             Results from last 7 days   Lab Units 06/11/21  1120   AMPH/METH  Negative   BARBITURATE UR  Negative   BENZODIAZEPINE UR  Positive*   COCAINE UR  Negative   METHADONE URINE  Negative   OPIATE UR  Negative   PCP UR  Negative   THC UR  Negative             ED Treatment:   Medication Administration from 06/11/2021 0926 to 06/11/2021 1704       Date/Time Order Dose Route Action Comments     06/11/2021 1025 ondansetron (ZOFRAN) injection 4 mg 4 mg Intravenous Given      06/11/2021 1026 meclizine (ANTIVERT) tablet 50 mg 50 mg Oral Given      06/11/2021 1347 sodium chloride 0 9 % bolus 1,000 mL 0 mL Intravenous Stopped      06/11/2021 1147 sodium chloride 0 9 % bolus 1,000 mL 1,000 mL Intravenous New Bag      06/11/2021 1052 iohexol (OMNIPAQUE) 350 MG/ML injection (SINGLE-DOSE) 80 mL 80 mL Intravenous Given      06/11/2021 1549 nicotine (NICODERM CQ) 21 mg/24 hr TD 24 hr patch 1 patch 1 patch Transdermal Medication Applied      06/11/2021 1543 enoxaparin (LOVENOX) subcutaneous injection 40 mg 40 mg Subcutaneous Not Given      06/11/2021 1602 diphenhydrAMINE (BENADRYL) injection 25 mg 25 mg Intravenous Given      06/11/2021 1603 metoclopramide (REGLAN) injection 10 mg 10 mg Intravenous Given      06/11/2021 1603 ketorolac (TORADOL) injection 30 mg 30 mg Intravenous Given           Present on Admission:   Left arm numbness   Anxiety   Current every day smoker   Anemia      Admitting Diagnosis: Numbness [R20 0]  Dizziness [R42]  Numbness and tingling [R20 0, R20 2]  Age/Sex: 42 y o  female  Admission Orders:  Scheduled Medications:  enoxaparin, 40 mg, Subcutaneous, Daily  nicotine, 1 patch, Transdermal, Daily  PARoxetine, 10 mg, Oral, HS      Continuous IV Infusions:     PRN Meds:  acetaminophen, 650 mg, Oral, Q4H PRN  ALPRAZolam, 1 mg, Oral, BID PRN  ondansetron, 4 mg, Intravenous, Q6H PRN  traZODone, 50 mg, Oral, HS PRN        IP CONSULT TO NEUROLOGY     24 hr tele  Neuro checks  Q 1  Hr  X 4, Q 2 hrs  X  8  Then Q 4 hrs  X  72  hrs    Network Utilization Review Department  ATTENTION: Please call with any questions or concerns to [phone] and carefully listen to the prompts so that you are directed to the right person  All voicemails are confidential   [facility] all requests for admission clinical reviews, approved or denied determinations and any other requests to dedicated fax number below belonging to the campus where the patient is receiving treatment   List of dedicated fax numbers for the Facilities:  [address] DENIALS (Administrative/Medical Necessity) [phone]   1000  [facility] (Maternity/NICU/Pediatrics) [phone]   [address] [phone]   [address] [address] [facility] [place] [address] [id number] [address] [place] [address] [phone]

## 2022-01-04 ENCOUNTER — TELEMEDICINE (OUTPATIENT)
Dept: FAMILY MEDICINE CLINIC | Facility: CLINIC | Age: 42
End: 2022-01-04
Payer: COMMERCIAL

## 2022-01-04 DIAGNOSIS — R21 SKIN RASH: ICD-10-CM

## 2022-01-04 DIAGNOSIS — R30.0 DYSURIA: Primary | ICD-10-CM

## 2022-01-04 DIAGNOSIS — R50.9 FEVER, UNSPECIFIED FEVER CAUSE: ICD-10-CM

## 2022-01-04 PROCEDURE — 99214 OFFICE O/P EST MOD 30 MIN: CPT | Performed by: FAMILY MEDICINE

## 2022-01-04 PROCEDURE — 3725F SCREEN DEPRESSION PERFORMED: CPT | Performed by: FAMILY MEDICINE

## 2022-01-04 PROCEDURE — 87636 SARSCOV2 & INF A&B AMP PRB: CPT | Performed by: FAMILY MEDICINE

## 2022-01-04 RX ORDER — DOXYCYCLINE 100 MG/1
100 TABLET ORAL 2 TIMES DAILY
Qty: 10 TABLET | Refills: 0 | Status: SHIPPED | OUTPATIENT
Start: 2022-01-04 | End: 2022-01-09

## 2022-01-04 NOTE — PROGRESS NOTES
Virtual Regular Visit    Verification of patient location:    Patient is located in the following state in which I hold an active license [place]      Assessment/Plan:    Problem List Items Addressed This Visit        Musculoskeletal and Integument    Skin rash       Other    Dysuria - Primary    Relevant Medications    doxycycline (ADOXA) 100 MG tablet    Other Relevant Orders    Urine culture    Fever    Relevant Orders    COVID/FLU- Collected at [facility] or [facility]        Skin rash  Consider shingles if she develops vesicles  Dysuria  After discussing risks and benefits of medication along with side effects will start the following: Will start antibiotic    Fever  Will test for Covid and Flu    Follow up as needed       Reason for visit is   Chief Complaint   Patient presents with    Virtual Regular Visit        Encounter provider Ken Conner MD    Provider located at [place] [address] [place] [phone]      Recent Visits  No visits were found meeting these conditions  Showing recent visits within past 7 days and meeting all other requirements  Future Appointments  No visits were found meeting these conditions  Showing future appointments within next 150 days and meeting all other requirements       The patient was identified by name and date of birth  Candelaria Dunbar was informed that this is a telemedicine visit and that the visit is being conducted through [address] [place] [address] Now and patient was informed that this is a secure, HIPAA-compliant platform  She agrees to proceed     My office door was closed  No one else was in the room  She acknowledged consent and understanding of privacy and security of the video platform  The patient has agreed to participate and understands they can discontinue the visit at any time  Patient is aware this is a billable service  Subjective  Candelaria Dunbar is a 42 y o  female Skin rash, dysuria, Headache         Patient was evaluated via Video, telemedicine  She says that her son was recently sick with fever and body aches told he could have had the flu not diagnosed however  Not tested for covid  She has not been feeling well for the past several days  She has been having a headaches, skin rash on her torso and chest and as well as abdominal muscle cramps and flank pain  Also has been having a dark colored urine associated with decreased PO intake  Past Medical History:   Diagnosis Date    Anxiety     LAST ASSESSED: 8/30/16    Depression     Endometriosis     History of varicose veins     Sleep disturbances        Past Surgical History:   Procedure Laterality Date    HERNIA REPAIR      LAPAROSCOPIC INGUINAL HERNIA REPAIR      INITIAL INGUINAL HERNIA    LAPAROSCOPY      EXPLORATORY    TUBAL LIGATION         Current Outpatient Medications   Medication Sig Dispense Refill    ALPRAZolam (XANAX) 1 mg tablet Take 1 tablet (1 mg total) by mouth 2 (two) times a day as needed for anxiety 60 tablet 1    doxycycline (ADOXA) 100 MG tablet Take 1 tablet (100 mg total) by mouth 2 (two) times a day for 5 days 10 tablet 0    nicotine (NICODERM CQ) 7 mg/24hr TD 24 hr patch Place 1 patch on the skin every 24 hours for 7 days 7 patch 0    PARoxetine (PAXIL) 10 mg tablet Take 1 tablet (10 mg total) by mouth daily at bedtime 30 tablet 2    traZODone (DESYREL) 50 mg tablet Take 50 mg by mouth daily at bedtime as needed for sleep       No current facility-administered medications for this visit  Allergies   Allergen Reactions    Bee Venom Hives    Lidocaine Hives    Penicillins Hives       Review of Systems   Constitutional: Negative for activity change, appetite change, fatigue and fever  HENT: Negative for congestion and ear discharge  Respiratory: Negative for cough and shortness of breath  Cardiovascular: Negative for chest pain and palpitations  Gastrointestinal: Negative for diarrhea and nausea  Genitourinary: Positive for dysuria and flank pain  Musculoskeletal: Negative for arthralgias and back pain  Skin: Positive for rash  Negative for color change  Neurological: Positive for headaches  Negative for dizziness  Psychiatric/Behavioral: Negative for agitation and behavioral problems  Video Exam    There were no vitals filed for this visit  Physical Exam  Constitutional:       General: She is not in acute distress  Appearance: She is well-developed  HENT:      Head: Normocephalic and atraumatic  Eyes:      Conjunctiva/sclera: Conjunctivae normal    Pulmonary:      Effort: Pulmonary effort is normal  No respiratory distress  Musculoskeletal:         General: Normal range of motion  Cervical back: Normal range of motion  Skin:     Findings: Erythema and rash present  Comments: erythematous macular rash noted on back   Neurological:      Mental Status: She is alert and oriented to person, place, and time  Psychiatric:         Behavior: Behavior normal           I spent 7 minutes directly with the patient during this visit    Sonali Martinez verbally agrees to participate in [employer]  Pt is aware that [employer] could be limited without vital signs or the ability to perform a full hands-on physical Arlesa Tracey understands she or the provider may request at any time to terminate the video visit and request the patient to seek care or treatment in person